# Patient Record
Sex: FEMALE | Race: WHITE | Employment: UNEMPLOYED | URBAN - NONMETROPOLITAN AREA
[De-identification: names, ages, dates, MRNs, and addresses within clinical notes are randomized per-mention and may not be internally consistent; named-entity substitution may affect disease eponyms.]

---

## 2018-01-12 LAB
CHOLESTEROL, TOTAL: 183 MG/DL
CHOLESTEROL/HDL RATIO: 3.4
HDLC SERPL-MCNC: 54 MG/DL (ref 35–70)
LDL CHOLESTEROL CALCULATED: 95 MG/DL (ref 0–160)
TRIGL SERPL-MCNC: 172 MG/DL
VLDLC SERPL CALC-MCNC: 34 MG/DL

## 2018-01-23 ENCOUNTER — OFFICE VISIT (OUTPATIENT)
Dept: CARDIOLOGY CLINIC | Age: 63
End: 2018-01-23
Payer: MEDICARE

## 2018-01-23 VITALS
HEIGHT: 64 IN | DIASTOLIC BLOOD PRESSURE: 90 MMHG | HEART RATE: 96 BPM | WEIGHT: 280 LBS | BODY MASS INDEX: 47.8 KG/M2 | SYSTOLIC BLOOD PRESSURE: 128 MMHG

## 2018-01-23 DIAGNOSIS — R06.00 DYSPNEA, UNSPECIFIED TYPE: Primary | ICD-10-CM

## 2018-01-23 PROCEDURE — 99204 OFFICE O/P NEW MOD 45 MIN: CPT | Performed by: INTERNAL MEDICINE

## 2018-01-23 RX ORDER — FLUOXETINE HYDROCHLORIDE 40 MG/1
40 CAPSULE ORAL DAILY
COMMUNITY
Start: 2017-10-16

## 2018-01-23 RX ORDER — LEVOTHYROXINE SODIUM 0.1 MG/1
TABLET ORAL
COMMUNITY
Start: 2018-01-15 | End: 2022-04-06

## 2018-01-23 RX ORDER — BENZTROPINE MESYLATE 0.5 MG/1
0.5 TABLET ORAL DAILY
COMMUNITY
Start: 2017-10-20

## 2018-01-23 RX ORDER — PRAVASTATIN SODIUM 10 MG
10 TABLET ORAL DAILY
COMMUNITY
Start: 2018-01-15

## 2018-01-23 RX ORDER — TOPIRAMATE 25 MG/1
25 TABLET ORAL 4 TIMES DAILY
COMMUNITY
End: 2022-04-06

## 2018-01-23 RX ORDER — LOSARTAN POTASSIUM AND HYDROCHLOROTHIAZIDE 12.5; 5 MG/1; MG/1
1 TABLET ORAL DAILY
Qty: 30 TABLET | Refills: 3 | Status: SHIPPED | OUTPATIENT
Start: 2018-01-23 | End: 2018-10-03 | Stop reason: SDUPTHER

## 2018-01-23 NOTE — PROGRESS NOTES
Today's Date: 1/23/2018  Patient Name: Livan Jordan  Patient's age: 63 y.o., 1955          The patient is a 63 y.o.  female is in the office for evaluation of WALKER and abnormal echo showing segmental wall motion abnormalities with preserved LVEF and mild diastolic dysfunction. Had a CXR on 1/12/2018 which showed mild pulmonary edema. Had severe fatigue and WALKER that has progressive over the last few weeks. Denies angina/syncope. Past Medical History:   has no past medical history on file. Past Surgical History:   has no past surgical history on file. Home Medications:    Prior to Admission medications    Not on File       Allergies:  Review of patient's allergies indicates not on file. Social History:        REVIEW OF SYSTEMS:  CONSTITUTIONAL:NEGATIVE  HEENT:NEG  Cardiovascular: No chest pain, Yes dyspnea on exertion, No palpitations. Lower extremity edema: No  RESPIRATORY: WALKER  GASTROINTESTINAL:  negative  GENITOURINARY:  negative  INTEGUMENT:  negative  MUSCULOSKELETAL:  positive for  pain  NEUROLOGICAL:  negative    PHYSICAL EXAM:      BP (!) 128/90   Pulse 96   Ht 5' 4\" (1.626 m)   Wt 280 lb (127 kg)   BMI 48.06 kg/m²    HEENT: PERRL, no cervical lymphadenopathy. No masses palpable. Cardiovascular:  · The apical impulse is not displaced  · Heart  Sounds:RRR, S4  · Jugular venous pulsation Normal  · The carotid upstroke is NL  · Peripheral pulses are symmetrical and full  Respiratory: Good respiratory effort. On auscultation: clear to auscultation bilaterally  Abdomen:  · No masses or tenderness  · Bowel sounds present  Extremities:  ·  No Cyanosis or Clubbing  ·  Lower extremity edema: No  Skin: Warm and dry    Cardiac data:    ECHO 1/18/2018: WMA, LVEF 48-50%, grade I diastolic dysfunction, no significant valvular abnormalities. Labs:      CBC: No results for input(s): WBC, HGB, HCT, PLT in the last 72 hours.   BMP: No results for input(s): NA, K, CO2, BUN,

## 2018-01-23 NOTE — PATIENT INSTRUCTIONS
Cardiolite Stress Test Instructions    Report to [facility], [address] entrance. Take a number for Registration at the [facility] GEROPSYCH UNIT. Date:                                                      Arrival Time:                          Please allow four hours to complete this test.     --PLEASE GIVE 24 HOURS NOTICE TO CANCEL/RESCHEDULE--     >>  Nothing to eat or drink for FOUR (4) HOURS prior to arrival time. >>  No caffeine for 24 hours prior to test. This includes decaffeinated beverages,  chocolate, tea and cola drinks. >>  If you are diabetic, check with your Primary Care Provider regarding changes in your insulin or diabetic pills on test day.    >>  No smoking for 12 hours before the test.     >>  Please bring your prescription medications with you on Stress Day. ~  Take regular medications with sips of water.       ~  Hold the following medications prior to day of stress test:     _______________________________________________       ~If you wear a Nitroglycerin patch, please hold for 48 hours. >>  If you are walking on the treadmill, wear comfortable clothes and shoes. Please do not apply lotion or oils to skin on the day of stress test.     +++ Pregnancy +++   If you are a woman of child bearing age, you will need to complete a blood test prior to your stress test. Please notify the nurse if you think you might be pregnant.

## 2018-02-16 ENCOUNTER — TELEPHONE (OUTPATIENT)
Dept: CARDIOLOGY CLINIC | Age: 63
End: 2018-02-16

## 2018-02-16 DIAGNOSIS — R06.00 DYSPNEA, UNSPECIFIED TYPE: ICD-10-CM

## 2018-02-16 NOTE — TELEPHONE ENCOUNTER
Pt aware of stress results. Follow up scheduled for 3/6/18 at [facility]..

## 2018-03-06 ENCOUNTER — OFFICE VISIT (OUTPATIENT)
Dept: CARDIOLOGY CLINIC | Age: 63
End: 2018-03-06
Payer: MEDICARE

## 2018-03-06 VITALS
BODY MASS INDEX: 48.06 KG/M2 | HEART RATE: 88 BPM | SYSTOLIC BLOOD PRESSURE: 124 MMHG | DIASTOLIC BLOOD PRESSURE: 80 MMHG | WEIGHT: 279.98 LBS

## 2018-03-06 DIAGNOSIS — R06.00 DYSPNEA, UNSPECIFIED TYPE: Primary | ICD-10-CM

## 2018-03-06 PROCEDURE — 99213 OFFICE O/P EST LOW 20 MIN: CPT | Performed by: INTERNAL MEDICINE

## 2018-10-01 RX ORDER — LOSARTAN POTASSIUM AND HYDROCHLOROTHIAZIDE 12.5; 5 MG/1; MG/1
TABLET ORAL
Qty: 30 TABLET | Refills: 3 | OUTPATIENT
Start: 2018-10-01

## 2018-10-03 RX ORDER — LOSARTAN POTASSIUM AND HYDROCHLOROTHIAZIDE 12.5; 5 MG/1; MG/1
1 TABLET ORAL DAILY
Qty: 30 TABLET | Refills: 3 | Status: SHIPPED | OUTPATIENT
Start: 2018-10-03 | End: 2018-10-18 | Stop reason: ALTCHOICE

## 2018-10-06 ENCOUNTER — HOSPITAL ENCOUNTER (INPATIENT)
Age: 63
LOS: 4 days | Discharge: HOME | DRG: 682 | End: 2018-10-10
Attending: EMERGENCY MEDICINE | Admitting: FAMILY MEDICINE
Payer: MEDICARE

## 2018-10-06 ENCOUNTER — APPOINTMENT (OUTPATIENT)
Dept: CT IMAGING | Age: 63
DRG: 682 | End: 2018-10-06
Payer: MEDICARE

## 2018-10-06 DIAGNOSIS — N39.0 URINARY TRACT INFECTION WITH HEMATURIA, SITE UNSPECIFIED: Primary | ICD-10-CM

## 2018-10-06 DIAGNOSIS — H53.8 BLURRED VISION: ICD-10-CM

## 2018-10-06 DIAGNOSIS — I95.9 HYPOTENSION, UNSPECIFIED HYPOTENSION TYPE: ICD-10-CM

## 2018-10-06 DIAGNOSIS — R31.9 URINARY TRACT INFECTION WITH HEMATURIA, SITE UNSPECIFIED: Primary | ICD-10-CM

## 2018-10-06 DIAGNOSIS — E87.6 HYPOKALEMIA: ICD-10-CM

## 2018-10-06 DIAGNOSIS — E78.5 HYPERLIPIDEMIA, UNSPECIFIED HYPERLIPIDEMIA TYPE: ICD-10-CM

## 2018-10-06 PROBLEM — E03.9 HYPOTHYROIDISM: Status: ACTIVE | Noted: 2018-10-06

## 2018-10-06 PROBLEM — N17.9 AKI (ACUTE KIDNEY INJURY) (HCC): Status: ACTIVE | Noted: 2018-10-06

## 2018-10-06 PROBLEM — J44.9 COPD (CHRONIC OBSTRUCTIVE PULMONARY DISEASE) (HCC): Status: ACTIVE | Noted: 2018-10-06

## 2018-10-06 PROBLEM — I10 HYPERTENSION: Status: ACTIVE | Noted: 2018-10-06

## 2018-10-06 PROBLEM — E87.1 HYPONATREMIA: Status: ACTIVE | Noted: 2018-10-06

## 2018-10-06 PROBLEM — R79.89 ELEVATED LACTIC ACID LEVEL: Status: ACTIVE | Noted: 2018-10-06

## 2018-10-06 PROBLEM — E11.9 DMII (DIABETES MELLITUS, TYPE 2) (HCC): Status: ACTIVE | Noted: 2018-10-06

## 2018-10-06 LAB
-: ABNORMAL
ABSOLUTE BANDS #: 2.02 K/UL
ABSOLUTE EOS #: 0 K/UL (ref 0–0.4)
ABSOLUTE IMMATURE GRANULOCYTE: ABNORMAL K/UL (ref 0–0.3)
ABSOLUTE LYMPH #: 0.5 K/UL (ref 1–4.8)
ABSOLUTE MONO #: 0.84 K/UL (ref 0.1–1.2)
ALBUMIN SERPL-MCNC: 3.6 G/DL (ref 3.5–5.2)
ALBUMIN/GLOBULIN RATIO: 1.1 (ref 1–2.5)
ALP BLD-CCNC: 81 U/L (ref 35–104)
ALT SERPL-CCNC: 16 U/L (ref 5–33)
AMORPHOUS: ABNORMAL
AMYLASE: 19 U/L (ref 28–100)
ANION GAP SERPL CALCULATED.3IONS-SCNC: 13 MMOL/L (ref 9–17)
AST SERPL-CCNC: 20 U/L
BACTERIA: ABNORMAL
BANDS: 12 %
BASOPHILS # BLD: 0 % (ref 0–1)
BASOPHILS ABSOLUTE: 0 K/UL (ref 0–0.2)
BILIRUB SERPL-MCNC: 1.02 MG/DL (ref 0.3–1.2)
BILIRUBIN DIRECT: 0.35 MG/DL
BILIRUBIN URINE: NEGATIVE
BILIRUBIN, INDIRECT: 0.67 MG/DL (ref 0–1)
BUN BLDV-MCNC: 13 MG/DL (ref 8–23)
BUN/CREAT BLD: 8 (ref 9–20)
CALCIUM SERPL-MCNC: 11.4 MG/DL (ref 8.6–10.4)
CASTS UA: ABNORMAL /LPF (ref 0–2)
CHLORIDE BLD-SCNC: 95 MMOL/L (ref 98–107)
CO2: 20 MMOL/L (ref 20–31)
COLOR: ABNORMAL
COMMENT UA: ABNORMAL
CREAT SERPL-MCNC: 1.72 MG/DL (ref 0.5–0.9)
CRYSTALS, UA: ABNORMAL /HPF
DIFFERENTIAL TYPE: ABNORMAL
EKG ATRIAL RATE: 104 BPM
EKG Q-T INTERVAL: 496 MS
EKG QRS DURATION: 104 MS
EKG QTC CALCULATION (BAZETT): 646 MS
EKG R AXIS: 53 DEGREES
EKG T AXIS: 145 DEGREES
EKG VENTRICULAR RATE: 102 BPM
EOSINOPHILS RELATIVE PERCENT: 0 % (ref 1–7)
EPITHELIAL CELLS UA: ABNORMAL /HPF (ref 0–5)
GFR AFRICAN AMERICAN: 37 ML/MIN
GFR NON-AFRICAN AMERICAN: 30 ML/MIN
GFR SERPL CREATININE-BSD FRML MDRD: ABNORMAL ML/MIN/{1.73_M2}
GFR SERPL CREATININE-BSD FRML MDRD: ABNORMAL ML/MIN/{1.73_M2}
GLOBULIN: 3.4 G/DL (ref 1.5–3.8)
GLUCOSE BLD-MCNC: 196 MG/DL (ref 65–105)
GLUCOSE BLD-MCNC: 229 MG/DL (ref 70–99)
GLUCOSE URINE: NEGATIVE
HCT VFR BLD CALC: 39.6 % (ref 36–46)
HEMOGLOBIN: 13.4 G/DL (ref 12–16)
IMMATURE GRANULOCYTES: ABNORMAL %
INR BLD: 1.2
KETONES, URINE: NEGATIVE
LACTIC ACID: 3 MMOL/L (ref 0.5–2.2)
LACTIC ACID: 3.6 MMOL/L (ref 0.5–2.2)
LEUKOCYTE ESTERASE, URINE: ABNORMAL
LIPASE: 9 U/L (ref 13–60)
LYMPHOCYTES # BLD: 3 % (ref 16–46)
MAGNESIUM: 1.3 MG/DL (ref 1.6–2.6)
MCH RBC QN AUTO: 31.6 PG (ref 26–34)
MCHC RBC AUTO-ENTMCNC: 33.7 G/DL (ref 31–37)
MCV RBC AUTO: 93.8 FL (ref 80–100)
MONOCYTES # BLD: 5 % (ref 4–11)
MORPHOLOGY: ABNORMAL
MUCUS: ABNORMAL
NITRITE, URINE: NEGATIVE
NRBC AUTOMATED: ABNORMAL PER 100 WBC
OTHER OBSERVATIONS UA: ABNORMAL
PARTIAL THROMBOPLASTIN TIME: 30.1 SEC (ref 27–35)
PDW BLD-RTO: 13 % (ref 11–14.5)
PH UA: 5.5 (ref 5–6)
PLATELET # BLD: 187 K/UL (ref 140–450)
PLATELET ESTIMATE: ABNORMAL
PMV BLD AUTO: 9.6 FL (ref 6–12)
POTASSIUM SERPL-SCNC: 3 MMOL/L (ref 3.7–5.3)
PROTEIN UA: NEGATIVE
PROTHROMBIN TIME: 12.3 SEC (ref 9.4–11.3)
RBC # BLD: 4.23 M/UL (ref 4–5.2)
RBC # BLD: ABNORMAL 10*6/UL
RBC UA: ABNORMAL /HPF (ref 0–4)
RENAL EPITHELIAL, UA: ABNORMAL /HPF
SEG NEUTROPHILS: 80 % (ref 43–77)
SEGMENTED NEUTROPHILS ABSOLUTE COUNT: 13.44 K/UL (ref 1.8–7.7)
SODIUM BLD-SCNC: 128 MMOL/L (ref 135–144)
SPECIFIC GRAVITY UA: 1 (ref 1.01–1.02)
TOTAL PROTEIN: 7 G/DL (ref 6.4–8.3)
TRICHOMONAS: ABNORMAL
TROPONIN INTERP: NORMAL
TROPONIN T: <0.03 NG/ML
TURBIDITY: ABNORMAL
URINE HGB: ABNORMAL
UROBILINOGEN, URINE: NORMAL
WBC # BLD: 16.8 K/UL (ref 3.5–11)
WBC # BLD: ABNORMAL 10*3/UL
WBC UA: >50 /HPF (ref 0–4)
YEAST: ABNORMAL

## 2018-10-06 PROCEDURE — 85730 THROMBOPLASTIN TIME PARTIAL: CPT

## 2018-10-06 PROCEDURE — 80076 HEPATIC FUNCTION PANEL: CPT

## 2018-10-06 PROCEDURE — 81001 URINALYSIS AUTO W/SCOPE: CPT

## 2018-10-06 PROCEDURE — 93005 ELECTROCARDIOGRAM TRACING: CPT

## 2018-10-06 PROCEDURE — 94760 N-INVAS EAR/PLS OXIMETRY 1: CPT

## 2018-10-06 PROCEDURE — 83735 ASSAY OF MAGNESIUM: CPT

## 2018-10-06 PROCEDURE — 6370000000 HC RX 637 (ALT 250 FOR IP): Performed by: EMERGENCY MEDICINE

## 2018-10-06 PROCEDURE — 82947 ASSAY GLUCOSE BLOOD QUANT: CPT

## 2018-10-06 PROCEDURE — 84484 ASSAY OF TROPONIN QUANT: CPT

## 2018-10-06 PROCEDURE — 80048 BASIC METABOLIC PNL TOTAL CA: CPT

## 2018-10-06 PROCEDURE — 87086 URINE CULTURE/COLONY COUNT: CPT

## 2018-10-06 PROCEDURE — 2060000000 HC ICU INTERMEDIATE R&B

## 2018-10-06 PROCEDURE — 6370000000 HC RX 637 (ALT 250 FOR IP): Performed by: NURSE PRACTITIONER

## 2018-10-06 PROCEDURE — 83690 ASSAY OF LIPASE: CPT

## 2018-10-06 PROCEDURE — 2580000003 HC RX 258: Performed by: NURSE PRACTITIONER

## 2018-10-06 PROCEDURE — 36415 COLL VENOUS BLD VENIPUNCTURE: CPT

## 2018-10-06 PROCEDURE — 2580000003 HC RX 258: Performed by: EMERGENCY MEDICINE

## 2018-10-06 PROCEDURE — 83605 ASSAY OF LACTIC ACID: CPT

## 2018-10-06 PROCEDURE — 87077 CULTURE AEROBIC IDENTIFY: CPT

## 2018-10-06 PROCEDURE — 87186 SC STD MICRODIL/AGAR DIL: CPT

## 2018-10-06 PROCEDURE — 6360000002 HC RX W HCPCS: Performed by: EMERGENCY MEDICINE

## 2018-10-06 PROCEDURE — 99285 EMERGENCY DEPT VISIT HI MDM: CPT

## 2018-10-06 PROCEDURE — 85610 PROTHROMBIN TIME: CPT

## 2018-10-06 PROCEDURE — 70450 CT HEAD/BRAIN W/O DYE: CPT

## 2018-10-06 PROCEDURE — 6360000002 HC RX W HCPCS: Performed by: NURSE PRACTITIONER

## 2018-10-06 PROCEDURE — 82150 ASSAY OF AMYLASE: CPT

## 2018-10-06 PROCEDURE — 87040 BLOOD CULTURE FOR BACTERIA: CPT

## 2018-10-06 PROCEDURE — 85025 COMPLETE CBC W/AUTO DIFF WBC: CPT

## 2018-10-06 RX ORDER — 0.9 % SODIUM CHLORIDE 0.9 %
1000 INTRAVENOUS SOLUTION INTRAVENOUS ONCE
Status: COMPLETED | OUTPATIENT
Start: 2018-10-06 | End: 2018-10-06

## 2018-10-06 RX ORDER — LEVOTHYROXINE SODIUM 0.1 MG/1
100 TABLET ORAL DAILY
Status: DISCONTINUED | OUTPATIENT
Start: 2018-10-07 | End: 2018-10-10 | Stop reason: HOSPADM

## 2018-10-06 RX ORDER — 0.9 % SODIUM CHLORIDE 0.9 %
500 INTRAVENOUS SOLUTION INTRAVENOUS ONCE
Status: COMPLETED | OUTPATIENT
Start: 2018-10-06 | End: 2018-10-06

## 2018-10-06 RX ORDER — 0.9 % SODIUM CHLORIDE 0.9 %
1000 INTRAVENOUS SOLUTION INTRAVENOUS ONCE
Status: COMPLETED | OUTPATIENT
Start: 2018-10-07 | End: 2018-10-07

## 2018-10-06 RX ORDER — BENZTROPINE MESYLATE 1 MG/1
0.5 TABLET ORAL NIGHTLY
Status: DISCONTINUED | OUTPATIENT
Start: 2018-10-06 | End: 2018-10-10 | Stop reason: HOSPADM

## 2018-10-06 RX ORDER — 0.9 % SODIUM CHLORIDE 0.9 %
1800 INTRAVENOUS SOLUTION INTRAVENOUS ONCE
Status: COMPLETED | OUTPATIENT
Start: 2018-10-06 | End: 2018-10-07

## 2018-10-06 RX ORDER — FLUOXETINE HYDROCHLORIDE 20 MG/1
40 CAPSULE ORAL DAILY
Status: DISCONTINUED | OUTPATIENT
Start: 2018-10-07 | End: 2018-10-10 | Stop reason: HOSPADM

## 2018-10-06 RX ORDER — POTASSIUM BICARBONATE 25 MEQ/1
25 TABLET, EFFERVESCENT ORAL 2 TIMES DAILY
Status: DISCONTINUED | OUTPATIENT
Start: 2018-10-06 | End: 2018-10-06

## 2018-10-06 RX ORDER — MAGNESIUM SULFATE 1 G/100ML
1 INJECTION INTRAVENOUS
Status: COMPLETED | OUTPATIENT
Start: 2018-10-07 | End: 2018-10-07

## 2018-10-06 RX ORDER — DEXTROSE MONOHYDRATE 50 MG/ML
100 INJECTION, SOLUTION INTRAVENOUS PRN
Status: DISCONTINUED | OUTPATIENT
Start: 2018-10-06 | End: 2018-10-10 | Stop reason: HOSPADM

## 2018-10-06 RX ORDER — SODIUM CHLORIDE 0.9 % (FLUSH) 0.9 %
10 SYRINGE (ML) INJECTION PRN
Status: DISCONTINUED | OUTPATIENT
Start: 2018-10-06 | End: 2018-10-10 | Stop reason: HOSPADM

## 2018-10-06 RX ORDER — NICOTINE POLACRILEX 4 MG
15 LOZENGE BUCCAL PRN
Status: DISCONTINUED | OUTPATIENT
Start: 2018-10-06 | End: 2018-10-10 | Stop reason: HOSPADM

## 2018-10-06 RX ORDER — ASPIRIN 81 MG/1
81 TABLET, CHEWABLE ORAL DAILY
Status: DISCONTINUED | OUTPATIENT
Start: 2018-10-07 | End: 2018-10-10 | Stop reason: HOSPADM

## 2018-10-06 RX ORDER — SODIUM CHLORIDE 0.9 % (FLUSH) 0.9 %
10 SYRINGE (ML) INJECTION EVERY 12 HOURS SCHEDULED
Status: DISCONTINUED | OUTPATIENT
Start: 2018-10-06 | End: 2018-10-10 | Stop reason: HOSPADM

## 2018-10-06 RX ORDER — SODIUM CHLORIDE AND POTASSIUM CHLORIDE .9; .15 G/100ML; G/100ML
SOLUTION INTRAVENOUS CONTINUOUS
Status: DISCONTINUED | OUTPATIENT
Start: 2018-10-06 | End: 2018-10-09

## 2018-10-06 RX ORDER — CIPROFLOXACIN 2 MG/ML
400 INJECTION, SOLUTION INTRAVENOUS ONCE
Status: COMPLETED | OUTPATIENT
Start: 2018-10-06 | End: 2018-10-06

## 2018-10-06 RX ORDER — DEXTROSE MONOHYDRATE 25 G/50ML
12.5 INJECTION, SOLUTION INTRAVENOUS PRN
Status: DISCONTINUED | OUTPATIENT
Start: 2018-10-06 | End: 2018-10-10 | Stop reason: HOSPADM

## 2018-10-06 RX ORDER — ONDANSETRON 2 MG/ML
4 INJECTION INTRAMUSCULAR; INTRAVENOUS EVERY 6 HOURS PRN
Status: DISCONTINUED | OUTPATIENT
Start: 2018-10-06 | End: 2018-10-10 | Stop reason: HOSPADM

## 2018-10-06 RX ADMIN — INSULIN LISPRO 1 UNITS: 100 INJECTION, SOLUTION INTRAVENOUS; SUBCUTANEOUS at 22:56

## 2018-10-06 RX ADMIN — MAGNESIUM SULFATE HEPTAHYDRATE 1 G: 1 INJECTION, SOLUTION INTRAVENOUS at 23:51

## 2018-10-06 RX ADMIN — POTASSIUM BICARBONATE 25 MEQ: 25 TABLET, EFFERVESCENT ORAL at 20:57

## 2018-10-06 RX ADMIN — BENZTROPINE MESYLATE 0.5 MG: 1 TABLET ORAL at 23:06

## 2018-10-06 RX ADMIN — CEFTRIAXONE 1 G: 1 INJECTION, POWDER, FOR SOLUTION INTRAMUSCULAR; INTRAVENOUS at 22:56

## 2018-10-06 RX ADMIN — SODIUM CHLORIDE 500 ML: 0.9 INJECTION, SOLUTION INTRAVENOUS at 18:59

## 2018-10-06 RX ADMIN — SODIUM CHLORIDE 1000 ML: 9 INJECTION, SOLUTION INTRAVENOUS at 20:14

## 2018-10-06 RX ADMIN — SODIUM CHLORIDE 1800 ML: 9 INJECTION, SOLUTION INTRAVENOUS at 22:46

## 2018-10-06 RX ADMIN — CIPROFLOXACIN 400 MG: 2 INJECTION, SOLUTION INTRAVENOUS at 21:18

## 2018-10-06 RX ADMIN — SODIUM CHLORIDE 500 ML: 0.9 INJECTION, SOLUTION INTRAVENOUS at 19:46

## 2018-10-06 ASSESSMENT — PAIN SCALES - GENERAL: PAINLEVEL_OUTOF10: 0

## 2018-10-06 NOTE — ED PROVIDER NOTES
[facility] ED  [address]  Phone: [phone]  eMERGENCY dEPARTMENT eNCOUnter      Pt Name: Janis Candelario  MRN: [id number]  [place] 1955  Date of evaluation: 10/6/2018    CHIEF COMPLAINT       Chief Complaint   Patient presents with    Shaking    Blurred Vision    Diabetes        HISTORY OF PRESENT ILLNESS    Janis Candelario is a 63 y.o. female who presents To the emergency department with worsening shakiness over the past couple of days as well as blurred vision. Yesterday had some abdominal pain that is currently resolved. Denies chest pain or shortness of breath no fevers or chills. She hasn't been eating very much according to the patient's family member. They've noticed that her shakiness is gotten worse over the past month. She's been incontinent of urine as well. She's been drinking more and urinating more than normal.  Blood sugars have been fluctuating. She presents hypotensive and tachycardic. REVIEW OF SYSTEMS       Constitutional: No fevers or chills   HEENT: No sore throat, rhinorrhea, or earache   Eyes: Positive blurry vision or double vision or drainage  Cardiovascular: No chest pain or tachycardia   Respiratory: No wheezing or shortness of breath no cough   Gastrointestinal: Positive decreased appetite no nausea or vomiting no diarrhea or constipation oh, pain  : No hematuria or dysuria   Musculoskeletal: No swelling or pain   Skin: No rash   Neurological: No focal neurologic complaints, paresthesias, weakness, or headache     PAST MEDICAL HISTORY    has a past medical history of Anxiety; Depression; Hyperlipidemia; Schizophrenia ([address] Type 2 diabetes mellitus without complication ([address] and Uterine cancer ([facility] [address] SURGICAL HISTORY      has a past surgical history that includes Hysterectomy, total abdominal and Colonoscopy.     CURRENT MEDICATIONS       Previous Medications    ASPIRIN 81 MG TABLET    Take 81 mg by mouth daily    BENZTROPINE

## 2018-10-07 ENCOUNTER — APPOINTMENT (OUTPATIENT)
Dept: GENERAL RADIOLOGY | Age: 63
DRG: 682 | End: 2018-10-07
Payer: MEDICARE

## 2018-10-07 PROBLEM — E87.1 HYPONATREMIA: Status: RESOLVED | Noted: 2018-10-06 | Resolved: 2018-10-07

## 2018-10-07 PROBLEM — R79.89 ELEVATED LACTIC ACID LEVEL: Status: RESOLVED | Noted: 2018-10-06 | Resolved: 2018-10-07

## 2018-10-07 LAB
ABSOLUTE EOS #: 0 K/UL (ref 0–0.4)
ABSOLUTE IMMATURE GRANULOCYTE: ABNORMAL K/UL (ref 0–0.3)
ABSOLUTE LYMPH #: 1.2 K/UL (ref 1–4.8)
ABSOLUTE MONO #: 1.3 K/UL (ref 0.1–1.2)
ANION GAP SERPL CALCULATED.3IONS-SCNC: 11 MMOL/L (ref 9–17)
BASOPHILS # BLD: 1 % (ref 0–1)
BASOPHILS ABSOLUTE: 0.1 K/UL (ref 0–0.2)
BUN BLDV-MCNC: 15 MG/DL (ref 8–23)
BUN/CREAT BLD: 12 (ref 9–20)
CALCIUM SERPL-MCNC: 9.9 MG/DL (ref 8.6–10.4)
CHLORIDE BLD-SCNC: 103 MMOL/L (ref 98–107)
CO2: 20 MMOL/L (ref 20–31)
CREAT SERPL-MCNC: 1.26 MG/DL (ref 0.5–0.9)
DIFFERENTIAL TYPE: ABNORMAL
EOSINOPHILS RELATIVE PERCENT: 0 % (ref 1–7)
ESTIMATED AVERAGE GLUCOSE: 306 MG/DL
GFR AFRICAN AMERICAN: 53 ML/MIN
GFR NON-AFRICAN AMERICAN: 43 ML/MIN
GFR SERPL CREATININE-BSD FRML MDRD: ABNORMAL ML/MIN/{1.73_M2}
GFR SERPL CREATININE-BSD FRML MDRD: ABNORMAL ML/MIN/{1.73_M2}
GLUCOSE BLD-MCNC: 137 MG/DL (ref 70–99)
GLUCOSE BLD-MCNC: 146 MG/DL (ref 65–105)
GLUCOSE BLD-MCNC: 151 MG/DL (ref 65–105)
GLUCOSE BLD-MCNC: 182 MG/DL (ref 65–105)
HBA1C MFR BLD: 12.3 % (ref 4.8–5.9)
HCT VFR BLD CALC: 35.3 % (ref 36–46)
HEMOGLOBIN: 11.6 G/DL (ref 12–16)
IMMATURE GRANULOCYTES: ABNORMAL %
LACTIC ACID: 1.1 MMOL/L (ref 0.5–2.2)
LYMPHOCYTES # BLD: 10 % (ref 16–46)
MAGNESIUM: 1.9 MG/DL (ref 1.6–2.6)
MCH RBC QN AUTO: 31.1 PG (ref 26–34)
MCHC RBC AUTO-ENTMCNC: 32.9 G/DL (ref 31–37)
MCV RBC AUTO: 94.4 FL (ref 80–100)
MONOCYTES # BLD: 10 % (ref 4–11)
NRBC AUTOMATED: ABNORMAL PER 100 WBC
PDW BLD-RTO: 13 % (ref 11–14.5)
PLATELET # BLD: 138 K/UL (ref 140–450)
PLATELET ESTIMATE: ABNORMAL
PMV BLD AUTO: 10.1 FL (ref 6–12)
POTASSIUM SERPL-SCNC: 3.1 MMOL/L (ref 3.7–5.3)
POTASSIUM SERPL-SCNC: 3.6 MMOL/L (ref 3.7–5.3)
RBC # BLD: 3.74 M/UL (ref 4–5.2)
RBC # BLD: ABNORMAL 10*6/UL
SEG NEUTROPHILS: 79 % (ref 43–77)
SEGMENTED NEUTROPHILS ABSOLUTE COUNT: 10.1 K/UL (ref 1.8–7.7)
SODIUM BLD-SCNC: 134 MMOL/L (ref 135–144)
TSH SERPL DL<=0.05 MIU/L-ACNC: 1.2 MIU/L (ref 0.3–5)
WBC # BLD: 12.7 K/UL (ref 3.5–11)
WBC # BLD: ABNORMAL 10*3/UL

## 2018-10-07 PROCEDURE — 6360000002 HC RX W HCPCS: Performed by: NURSE PRACTITIONER

## 2018-10-07 PROCEDURE — 84443 ASSAY THYROID STIM HORMONE: CPT

## 2018-10-07 PROCEDURE — 6370000000 HC RX 637 (ALT 250 FOR IP): Performed by: NURSE PRACTITIONER

## 2018-10-07 PROCEDURE — 83605 ASSAY OF LACTIC ACID: CPT

## 2018-10-07 PROCEDURE — 2060000000 HC ICU INTERMEDIATE R&B

## 2018-10-07 PROCEDURE — 83036 HEMOGLOBIN GLYCOSYLATED A1C: CPT

## 2018-10-07 PROCEDURE — 85025 COMPLETE CBC W/AUTO DIFF WBC: CPT

## 2018-10-07 PROCEDURE — 36415 COLL VENOUS BLD VENIPUNCTURE: CPT

## 2018-10-07 PROCEDURE — 99232 SBSQ HOSP IP/OBS MODERATE 35: CPT | Performed by: FAMILY MEDICINE

## 2018-10-07 PROCEDURE — 6360000002 HC RX W HCPCS: Performed by: FAMILY MEDICINE

## 2018-10-07 PROCEDURE — 2580000003 HC RX 258: Performed by: NURSE PRACTITIONER

## 2018-10-07 PROCEDURE — 94761 N-INVAS EAR/PLS OXIMETRY MLT: CPT

## 2018-10-07 PROCEDURE — 84132 ASSAY OF SERUM POTASSIUM: CPT

## 2018-10-07 PROCEDURE — 80048 BASIC METABOLIC PNL TOTAL CA: CPT

## 2018-10-07 PROCEDURE — 83735 ASSAY OF MAGNESIUM: CPT

## 2018-10-07 PROCEDURE — 6370000000 HC RX 637 (ALT 250 FOR IP): Performed by: FAMILY MEDICINE

## 2018-10-07 PROCEDURE — 71045 X-RAY EXAM CHEST 1 VIEW: CPT

## 2018-10-07 PROCEDURE — 82947 ASSAY GLUCOSE BLOOD QUANT: CPT

## 2018-10-07 RX ORDER — INSULIN GLARGINE 100 [IU]/ML
10 INJECTION, SOLUTION SUBCUTANEOUS NIGHTLY
Status: DISCONTINUED | OUTPATIENT
Start: 2018-10-07 | End: 2018-10-10 | Stop reason: HOSPADM

## 2018-10-07 RX ORDER — POTASSIUM CHLORIDE 20 MEQ/1
20 TABLET, EXTENDED RELEASE ORAL
Status: DISCONTINUED | OUTPATIENT
Start: 2018-10-07 | End: 2018-10-07

## 2018-10-07 RX ORDER — POTASSIUM CHLORIDE 20 MEQ/1
40 TABLET, EXTENDED RELEASE ORAL
Status: DISCONTINUED | OUTPATIENT
Start: 2018-10-08 | End: 2018-10-10 | Stop reason: HOSPADM

## 2018-10-07 RX ORDER — POTASSIUM CHLORIDE 20 MEQ/1
40 TABLET, EXTENDED RELEASE ORAL ONCE
Status: COMPLETED | OUTPATIENT
Start: 2018-10-07 | End: 2018-10-07

## 2018-10-07 RX ORDER — ACETAMINOPHEN 325 MG/1
650 TABLET ORAL EVERY 4 HOURS PRN
Status: DISCONTINUED | OUTPATIENT
Start: 2018-10-07 | End: 2018-10-10 | Stop reason: HOSPADM

## 2018-10-07 RX ADMIN — POTASSIUM CHLORIDE 20 MEQ: 20 TABLET, EXTENDED RELEASE ORAL at 08:46

## 2018-10-07 RX ADMIN — ASPIRIN 81 MG 81 MG: 81 TABLET ORAL at 08:46

## 2018-10-07 RX ADMIN — POTASSIUM CHLORIDE AND SODIUM CHLORIDE: 900; 150 INJECTION, SOLUTION INTRAVENOUS at 10:08

## 2018-10-07 RX ADMIN — INSULIN LISPRO 1 UNITS: 100 INJECTION, SOLUTION INTRAVENOUS; SUBCUTANEOUS at 17:03

## 2018-10-07 RX ADMIN — FLUOXETINE 40 MG: 20 CAPSULE ORAL at 08:46

## 2018-10-07 RX ADMIN — ENOXAPARIN SODIUM 40 MG: 40 INJECTION SUBCUTANEOUS at 08:46

## 2018-10-07 RX ADMIN — INSULIN LISPRO 1 UNITS: 100 INJECTION, SOLUTION INTRAVENOUS; SUBCUTANEOUS at 12:51

## 2018-10-07 RX ADMIN — CEFTRIAXONE 1 G: 1 INJECTION, POWDER, FOR SOLUTION INTRAMUSCULAR; INTRAVENOUS at 22:49

## 2018-10-07 RX ADMIN — BENZTROPINE MESYLATE 0.5 MG: 1 TABLET ORAL at 21:26

## 2018-10-07 RX ADMIN — SODIUM CHLORIDE 1000 ML: 9 INJECTION, SOLUTION INTRAVENOUS at 00:52

## 2018-10-07 RX ADMIN — MAGNESIUM SULFATE HEPTAHYDRATE 1 G: 1 INJECTION, SOLUTION INTRAVENOUS at 00:51

## 2018-10-07 RX ADMIN — LEVOTHYROXINE SODIUM 100 MCG: 100 TABLET ORAL at 06:34

## 2018-10-07 RX ADMIN — ACETAMINOPHEN 650 MG: 325 TABLET ORAL at 22:33

## 2018-10-07 RX ADMIN — POTASSIUM CHLORIDE 40 MEQ: 20 TABLET, EXTENDED RELEASE ORAL at 06:34

## 2018-10-07 RX ADMIN — INSULIN GLARGINE 10 UNITS: 100 INJECTION, SOLUTION SUBCUTANEOUS at 21:26

## 2018-10-07 RX ADMIN — ACETAMINOPHEN 650 MG: 325 TABLET ORAL at 12:47

## 2018-10-07 RX ADMIN — POTASSIUM CHLORIDE AND SODIUM CHLORIDE: 900; 150 INJECTION, SOLUTION INTRAVENOUS at 19:44

## 2018-10-07 RX ADMIN — INSULIN LISPRO 1 UNITS: 100 INJECTION, SOLUTION INTRAVENOUS; SUBCUTANEOUS at 21:26

## 2018-10-07 RX ADMIN — POTASSIUM CHLORIDE 40 MEQ: 20 TABLET, EXTENDED RELEASE ORAL at 23:41

## 2018-10-07 RX ADMIN — POTASSIUM CHLORIDE AND SODIUM CHLORIDE: 900; 150 INJECTION, SOLUTION INTRAVENOUS at 01:57

## 2018-10-07 ASSESSMENT — PAIN SCALES - GENERAL
PAINLEVEL_OUTOF10: 0
PAINLEVEL_OUTOF10: 1
PAINLEVEL_OUTOF10: 0

## 2018-10-07 NOTE — H&P
post-void residual to check for urinary retention  2. Elevated lactic acid/SIRS--IV fluid boluses, Lactic acid Q4h, blood cultures pending  3. REBECCA--IV hydration, monitor renal function and I&Os--holding Hyzaar  4. Hypokalemia--replaced, recheck in AM  5. DMII with hyperglycemia--ISS, monitor and titrate prn, check HgbA1C in AM--holding glucophage during hospitalization  6. DVT prophylaxis--lovenox  7. Home medications reviewed  8.  See orders     Note that over 50 minutes was spent in evaluation of the patient, review of the chart and pertinent records, discussion with family/staff, etc    Micki PHAN, NP-C, FNP-BC  10:56 PM  10/6/2018

## 2018-10-07 NOTE — PLAN OF CARE
Problem: Falls - Risk of:  Goal: Absence of physical injury  Absence of physical injury   Outcome: Ongoing      Problem: Sensory:  Goal: General experience of comfort will improve  General experience of comfort will improve  Outcome: Ongoing      Problem: Urinary Elimination:  Goal: Signs and symptoms of infection will decrease  Signs and symptoms of infection will decrease  Outcome: Ongoing

## 2018-10-07 NOTE — PROGRESS NOTES
bruit, no JVD, supple, symmetrical, trachea midline and thyroid not enlarged, symmetric, no tenderness/mass/nodules  Lungs: clear to auscultation bilaterally  Heart: regular rate and rhythm, S1, S2 normal, no murmur, click, rub or gallop  Abdomen: soft, non-tender; bowel sounds normal; no masses,  no organomegaly,obese. Extremities: extremities normal, atraumatic, no cyanosis or edema  Neurologic: Mental status: Alert, oriented, thought content appropriate    Assessment and Plan:   1. UTI. 2. Generalized weakness. 3. DM type 2 poorly controlled/    Patient continues to require in patient admission FOR iv antibiotics,await cultures,Insulin SS coverage for DM also start on lantus tonight. CXR to evaluate cough,PT/OT consult    Patient Active Problem List:     UTI (urinary tract infection)     Hypokalemia     REBECCA (acute kidney injury) ([address]     DMII (diabetes mellitus, type 2) (HCC)     Hypertension     COPD (chronic obstructive pulmonary disease) ([address]     Hyperlipidemia     Hypothyroidism      Jarocho Villalobos MD  [facility] Hospitalist

## 2018-10-08 PROBLEM — E66.01 CLASS 3 SEVERE OBESITY WITH BODY MASS INDEX (BMI) OF 40.0 TO 44.9 IN ADULT (HCC): Status: ACTIVE | Noted: 2018-10-08

## 2018-10-08 LAB
ABSOLUTE EOS #: 0.3 K/UL (ref 0–0.4)
ABSOLUTE IMMATURE GRANULOCYTE: ABNORMAL K/UL (ref 0–0.3)
ABSOLUTE LYMPH #: 1.82 K/UL (ref 1–4.8)
ABSOLUTE MONO #: 1.62 K/UL (ref 0.1–1.2)
ANION GAP SERPL CALCULATED.3IONS-SCNC: 7 MMOL/L (ref 9–17)
BASOPHILS # BLD: 1 % (ref 0–1)
BASOPHILS ABSOLUTE: 0.1 K/UL (ref 0–0.2)
BUN BLDV-MCNC: 8 MG/DL (ref 8–23)
BUN/CREAT BLD: 9 (ref 9–20)
CALCIUM SERPL-MCNC: 10.3 MG/DL (ref 8.6–10.4)
CHLORIDE BLD-SCNC: 110 MMOL/L (ref 98–107)
CO2: 23 MMOL/L (ref 20–31)
CREAT SERPL-MCNC: 0.89 MG/DL (ref 0.5–0.9)
DIFFERENTIAL TYPE: ABNORMAL
EOSINOPHILS RELATIVE PERCENT: 3 % (ref 1–7)
GFR AFRICAN AMERICAN: >60 ML/MIN
GFR NON-AFRICAN AMERICAN: >60 ML/MIN
GFR SERPL CREATININE-BSD FRML MDRD: ABNORMAL ML/MIN/{1.73_M2}
GFR SERPL CREATININE-BSD FRML MDRD: ABNORMAL ML/MIN/{1.73_M2}
GLUCOSE BLD-MCNC: 141 MG/DL (ref 65–105)
GLUCOSE BLD-MCNC: 141 MG/DL (ref 70–99)
GLUCOSE BLD-MCNC: 148 MG/DL (ref 65–105)
GLUCOSE BLD-MCNC: 168 MG/DL (ref 65–105)
HCT VFR BLD CALC: 32.6 % (ref 36–46)
HEMOGLOBIN: 10.8 G/DL (ref 12–16)
IMMATURE GRANULOCYTES: ABNORMAL %
LYMPHOCYTES # BLD: 18 % (ref 16–46)
MAGNESIUM: 1.7 MG/DL (ref 1.6–2.6)
MCH RBC QN AUTO: 30.7 PG (ref 26–34)
MCHC RBC AUTO-ENTMCNC: 33.1 G/DL (ref 31–37)
MCV RBC AUTO: 92.5 FL (ref 80–100)
MONOCYTES # BLD: 16 % (ref 4–11)
MORPHOLOGY: ABNORMAL
NRBC AUTOMATED: ABNORMAL PER 100 WBC
PDW BLD-RTO: 11.7 % (ref 11–14.5)
PLATELET # BLD: 136 K/UL (ref 140–450)
PLATELET ESTIMATE: ABNORMAL
PMV BLD AUTO: 9.9 FL (ref 6–12)
POTASSIUM SERPL-SCNC: 4.6 MMOL/L (ref 3.7–5.3)
RBC # BLD: 3.52 M/UL (ref 4–5.2)
RBC # BLD: ABNORMAL 10*6/UL
SEG NEUTROPHILS: 62 % (ref 43–77)
SEGMENTED NEUTROPHILS ABSOLUTE COUNT: 6.26 K/UL (ref 1.8–7.7)
SODIUM BLD-SCNC: 140 MMOL/L (ref 135–144)
WBC # BLD: 10.1 K/UL (ref 3.5–11)
WBC # BLD: ABNORMAL 10*3/UL

## 2018-10-08 PROCEDURE — 83735 ASSAY OF MAGNESIUM: CPT

## 2018-10-08 PROCEDURE — G8978 MOBILITY CURRENT STATUS: HCPCS | Performed by: PHYSICAL THERAPIST

## 2018-10-08 PROCEDURE — 97116 GAIT TRAINING THERAPY: CPT | Performed by: PHYSICAL THERAPIST

## 2018-10-08 PROCEDURE — 94761 N-INVAS EAR/PLS OXIMETRY MLT: CPT

## 2018-10-08 PROCEDURE — 97161 PT EVAL LOW COMPLEX 20 MIN: CPT | Performed by: PHYSICAL THERAPIST

## 2018-10-08 PROCEDURE — 85025 COMPLETE CBC W/AUTO DIFF WBC: CPT

## 2018-10-08 PROCEDURE — G8988 SELF CARE GOAL STATUS: HCPCS | Performed by: OCCUPATIONAL THERAPIST

## 2018-10-08 PROCEDURE — 94640 AIRWAY INHALATION TREATMENT: CPT

## 2018-10-08 PROCEDURE — G8979 MOBILITY GOAL STATUS: HCPCS | Performed by: PHYSICAL THERAPIST

## 2018-10-08 PROCEDURE — 36415 COLL VENOUS BLD VENIPUNCTURE: CPT

## 2018-10-08 PROCEDURE — 99232 SBSQ HOSP IP/OBS MODERATE 35: CPT | Performed by: INTERNAL MEDICINE

## 2018-10-08 PROCEDURE — 2580000003 HC RX 258: Performed by: NURSE PRACTITIONER

## 2018-10-08 PROCEDURE — 82947 ASSAY GLUCOSE BLOOD QUANT: CPT

## 2018-10-08 PROCEDURE — 2060000000 HC ICU INTERMEDIATE R&B

## 2018-10-08 PROCEDURE — 6360000002 HC RX W HCPCS: Performed by: INTERNAL MEDICINE

## 2018-10-08 PROCEDURE — G8987 SELF CARE CURRENT STATUS: HCPCS | Performed by: OCCUPATIONAL THERAPIST

## 2018-10-08 PROCEDURE — 97165 OT EVAL LOW COMPLEX 30 MIN: CPT | Performed by: OCCUPATIONAL THERAPIST

## 2018-10-08 PROCEDURE — 6370000000 HC RX 637 (ALT 250 FOR IP): Performed by: NURSE PRACTITIONER

## 2018-10-08 PROCEDURE — 6360000002 HC RX W HCPCS: Performed by: FAMILY MEDICINE

## 2018-10-08 PROCEDURE — 80048 BASIC METABOLIC PNL TOTAL CA: CPT

## 2018-10-08 PROCEDURE — 6360000002 HC RX W HCPCS

## 2018-10-08 PROCEDURE — 6370000000 HC RX 637 (ALT 250 FOR IP): Performed by: FAMILY MEDICINE

## 2018-10-08 PROCEDURE — 6360000002 HC RX W HCPCS: Performed by: NURSE PRACTITIONER

## 2018-10-08 RX ORDER — ALBUTEROL SULFATE 2.5 MG/3ML
2.5 SOLUTION RESPIRATORY (INHALATION) EVERY 4 HOURS PRN
Status: DISCONTINUED | OUTPATIENT
Start: 2018-10-08 | End: 2018-10-10 | Stop reason: HOSPADM

## 2018-10-08 RX ORDER — FUROSEMIDE 10 MG/ML
INJECTION INTRAMUSCULAR; INTRAVENOUS
Status: COMPLETED
Start: 2018-10-08 | End: 2018-10-08

## 2018-10-08 RX ORDER — FUROSEMIDE 10 MG/ML
20 INJECTION INTRAMUSCULAR; INTRAVENOUS ONCE
Status: COMPLETED | OUTPATIENT
Start: 2018-10-08 | End: 2018-10-08

## 2018-10-08 RX ADMIN — INSULIN GLARGINE 10 UNITS: 100 INJECTION, SOLUTION SUBCUTANEOUS at 20:23

## 2018-10-08 RX ADMIN — FLUOXETINE 40 MG: 20 CAPSULE ORAL at 08:41

## 2018-10-08 RX ADMIN — INSULIN LISPRO 1 UNITS: 100 INJECTION, SOLUTION INTRAVENOUS; SUBCUTANEOUS at 12:06

## 2018-10-08 RX ADMIN — CEFTRIAXONE 1 G: 1 INJECTION, POWDER, FOR SOLUTION INTRAMUSCULAR; INTRAVENOUS at 22:35

## 2018-10-08 RX ADMIN — ENOXAPARIN SODIUM 40 MG: 40 INJECTION SUBCUTANEOUS at 08:41

## 2018-10-08 RX ADMIN — FUROSEMIDE 20 MG: 10 INJECTION INTRAMUSCULAR; INTRAVENOUS at 17:54

## 2018-10-08 RX ADMIN — LEVOTHYROXINE SODIUM 100 MCG: 100 TABLET ORAL at 06:30

## 2018-10-08 RX ADMIN — ASPIRIN 81 MG 81 MG: 81 TABLET ORAL at 08:41

## 2018-10-08 RX ADMIN — POTASSIUM CHLORIDE AND SODIUM CHLORIDE: 900; 150 INJECTION, SOLUTION INTRAVENOUS at 06:30

## 2018-10-08 RX ADMIN — INSULIN LISPRO 1 UNITS: 100 INJECTION, SOLUTION INTRAVENOUS; SUBCUTANEOUS at 08:41

## 2018-10-08 RX ADMIN — BENZTROPINE MESYLATE 0.5 MG: 1 TABLET ORAL at 20:23

## 2018-10-08 RX ADMIN — INSULIN LISPRO 1 UNITS: 100 INJECTION, SOLUTION INTRAVENOUS; SUBCUTANEOUS at 17:31

## 2018-10-08 RX ADMIN — POTASSIUM CHLORIDE 40 MEQ: 20 TABLET, EXTENDED RELEASE ORAL at 08:41

## 2018-10-08 RX ADMIN — POTASSIUM CHLORIDE AND SODIUM CHLORIDE: 900; 150 INJECTION, SOLUTION INTRAVENOUS at 16:32

## 2018-10-08 RX ADMIN — INSULIN LISPRO 1 UNITS: 100 INJECTION, SOLUTION INTRAVENOUS; SUBCUTANEOUS at 20:24

## 2018-10-08 RX ADMIN — FUROSEMIDE 20 MG: 10 INJECTION, SOLUTION INTRAMUSCULAR; INTRAVENOUS at 17:54

## 2018-10-08 RX ADMIN — ALBUTEROL SULFATE 2.5 MG: 2.5 SOLUTION RESPIRATORY (INHALATION) at 18:04

## 2018-10-08 ASSESSMENT — PAIN SCALES - GENERAL
PAINLEVEL_OUTOF10: 0

## 2018-10-08 NOTE — PROGRESS NOTES
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       Objective   Vision: Impaired  Vision Exceptions: Wears glasses for reading (recent decline with vision)  Hearing: Within functional limits    Orientation  Overall Orientation Status: Within Functional Limits     Standing Balance  Sit to stand: Stand by assistance  Stand to sit: Stand by assistance  ADL  LE Dressing: Independent  Toileting: Stand by assistance        Bed mobility  Supine to Sit: Stand by assistance  Sit to Supine: Stand by assistance  Scooting: Stand by assistance  Transfers  Sit to stand: Stand by assistance  Stand to sit: Stand by assistance     Cognition  Overall Cognitive Status: Exceptions  Following Commands: Follows one step commands consistently; Follows one step commands with increased time  Memory: Decreased recall of recent events;Decreased short term memory  Problem Solving: Assistance required to generate solutions;Assistance required to implement solutions  Initiation: Requires cues for some                 LUE AROM (degrees)  LUE AROM : WFL  Left Hand AROM (degrees)  Left Hand AROM: WFL  RUE AROM (degrees)  RUE AROM : WFL  Right Hand AROM (degrees)  Right Hand AROM: WFL  LUE Strength  Gross LUE Strength: WFL  RUE Strength  Gross RUE Strength: WFL                  Assessment   Performance deficits / Impairments: Decreased functional mobility ; Decreased cognition;Decreased high-level IADLs;Decreased balance;Decreased vision/visual deficit; Decreased safe awareness;Decreased ADL status; Decreased endurance  Treatment Diagnosis: general weakness  Prognosis: Good  Decision Making: Low Complexity  REQUIRES OT FOLLOW UP: Yes  Safety Devices  Safety Devices in place: Yes  Type of devices: Bed alarm in

## 2018-10-08 NOTE — PROGRESS NOTES
endurance         Plan   Plan  Times per week: 7  Times per day: Daily  Current Treatment Recommendations: Strengthening, ROM, Balance Training, Functional Mobility Training, Transfer Training, ADL/Self-care Training, Gait Training, Neuromuscular Re-education, Home Exercise Program  Safety Devices  Type of devices: All fall risk precautions in place, Bed alarm in place, Call light within reach, Gait belt, Patient at risk for falls, Left in bed, Nurse notified    G-Code  PT G-Codes  Functional Limitation: Mobility: Walking and moving around  Mobility: Walking and Moving Around Current Status (): At least 20 percent but less than 40 percent impaired, limited or restricted  Mobility: Walking and Moving Around Goal Status ():  At least 1 percent but less than 20 percent impaired, limited or restricted  OutComes Score                                           AM-PAC Score             Goals  Short term goals  Time Frame for Short term goals: 7 days  Short term goal 1: Assess functional ability, initiate HEP  Short term goal 2: Pt to perform bed mobility mod I to prepare for D/C  Short term goal 3: Pt to perform functional transfers in room with mod I to prepare for D/C  Short term goal 4: Pt to amb 48' with AD and mod I to prepare for D/C  Patient Goals   Patient goals : 'get stronger so I can go back home\"       Therapy Time   Individual Concurrent Group Co-treatment   Time In 1015         Time Out 1041         Minutes 26         Timed Code Treatment Minutes: 15 Minutes       Mame Mills, PT, DPT

## 2018-10-08 NOTE — PLAN OF CARE
Problem: Falls - Risk of:  Goal: Absence of physical injury  Absence of physical injury   Outcome: Ongoing      Problem: Urinary Elimination:  Goal: Ability to reestablish a normal urinary elimination pattern will improve - after catheter removal  Ability to reestablish a normal urinary elimination pattern will improve   Outcome: Ongoing

## 2018-10-09 ENCOUNTER — APPOINTMENT (OUTPATIENT)
Dept: GENERAL RADIOLOGY | Age: 63
DRG: 682 | End: 2018-10-09
Payer: MEDICARE

## 2018-10-09 LAB
ABSOLUTE EOS #: 0.1 K/UL (ref 0–0.4)
ABSOLUTE IMMATURE GRANULOCYTE: ABNORMAL K/UL (ref 0–0.3)
ABSOLUTE LYMPH #: 0.7 K/UL (ref 1–4.8)
ABSOLUTE MONO #: 0.2 K/UL (ref 0.1–1.2)
ANION GAP SERPL CALCULATED.3IONS-SCNC: 11 MMOL/L (ref 9–17)
BASOPHILS # BLD: 1 % (ref 0–1)
BASOPHILS ABSOLUTE: 0 K/UL (ref 0–0.2)
BUN BLDV-MCNC: 6 MG/DL (ref 8–23)
BUN/CREAT BLD: 8 (ref 9–20)
CALCIUM SERPL-MCNC: 10 MG/DL (ref 8.6–10.4)
CHLORIDE BLD-SCNC: 104 MMOL/L (ref 98–107)
CO2: 23 MMOL/L (ref 20–31)
CREAT SERPL-MCNC: 0.71 MG/DL (ref 0.5–0.9)
CULTURE: ABNORMAL
DIFFERENTIAL TYPE: ABNORMAL
EKG ATRIAL RATE: 132 BPM
EKG P AXIS: 55 DEGREES
EKG P-R INTERVAL: 122 MS
EKG Q-T INTERVAL: 324 MS
EKG QRS DURATION: 92 MS
EKG QTC CALCULATION (BAZETT): 480 MS
EKG R AXIS: 36 DEGREES
EKG T AXIS: 157 DEGREES
EKG VENTRICULAR RATE: 132 BPM
EOSINOPHILS RELATIVE PERCENT: 2 % (ref 1–7)
GFR AFRICAN AMERICAN: >60 ML/MIN
GFR NON-AFRICAN AMERICAN: >60 ML/MIN
GFR SERPL CREATININE-BSD FRML MDRD: ABNORMAL ML/MIN/{1.73_M2}
GFR SERPL CREATININE-BSD FRML MDRD: ABNORMAL ML/MIN/{1.73_M2}
GLUCOSE BLD-MCNC: 148 MG/DL (ref 70–99)
GLUCOSE BLD-MCNC: 172 MG/DL (ref 65–105)
GLUCOSE BLD-MCNC: 233 MG/DL (ref 65–105)
GLUCOSE BLD-MCNC: 91 MG/DL (ref 65–105)
HCT VFR BLD CALC: 36 % (ref 36–46)
HEMOGLOBIN: 11.8 G/DL (ref 12–16)
IMMATURE GRANULOCYTES: ABNORMAL %
LYMPHOCYTES # BLD: 13 % (ref 16–46)
Lab: ABNORMAL
MCH RBC QN AUTO: 31.5 PG (ref 26–34)
MCHC RBC AUTO-ENTMCNC: 32.9 G/DL (ref 31–37)
MCV RBC AUTO: 95.8 FL (ref 80–100)
MONOCYTES # BLD: 4 % (ref 4–11)
NRBC AUTOMATED: ABNORMAL PER 100 WBC
ORGANISM: ABNORMAL
PDW BLD-RTO: 13.6 % (ref 11–14.5)
PLATELET # BLD: 126 K/UL (ref 140–450)
PLATELET ESTIMATE: ABNORMAL
PMV BLD AUTO: 9.5 FL (ref 6–12)
POTASSIUM SERPL-SCNC: 4.7 MMOL/L (ref 3.7–5.3)
RBC # BLD: 3.76 M/UL (ref 4–5.2)
RBC # BLD: ABNORMAL 10*6/UL
SEG NEUTROPHILS: 80 % (ref 43–77)
SEGMENTED NEUTROPHILS ABSOLUTE COUNT: 4.3 K/UL (ref 1.8–7.7)
SODIUM BLD-SCNC: 138 MMOL/L (ref 135–144)
SPECIMEN DESCRIPTION: ABNORMAL
STATUS: ABNORMAL
WBC # BLD: 5.4 K/UL (ref 3.5–11)
WBC # BLD: ABNORMAL 10*3/UL

## 2018-10-09 PROCEDURE — 6370000000 HC RX 637 (ALT 250 FOR IP): Performed by: INTERNAL MEDICINE

## 2018-10-09 PROCEDURE — 93005 ELECTROCARDIOGRAM TRACING: CPT

## 2018-10-09 PROCEDURE — 94640 AIRWAY INHALATION TREATMENT: CPT

## 2018-10-09 PROCEDURE — 2060000000 HC ICU INTERMEDIATE R&B

## 2018-10-09 PROCEDURE — 94761 N-INVAS EAR/PLS OXIMETRY MLT: CPT

## 2018-10-09 PROCEDURE — 6360000002 HC RX W HCPCS: Performed by: INTERNAL MEDICINE

## 2018-10-09 PROCEDURE — 85025 COMPLETE CBC W/AUTO DIFF WBC: CPT

## 2018-10-09 PROCEDURE — 6370000000 HC RX 637 (ALT 250 FOR IP): Performed by: FAMILY MEDICINE

## 2018-10-09 PROCEDURE — 97110 THERAPEUTIC EXERCISES: CPT | Performed by: PHYSICAL THERAPY ASSISTANT

## 2018-10-09 PROCEDURE — 6370000000 HC RX 637 (ALT 250 FOR IP): Performed by: NURSE PRACTITIONER

## 2018-10-09 PROCEDURE — 6360000002 HC RX W HCPCS: Performed by: NURSE PRACTITIONER

## 2018-10-09 PROCEDURE — 99232 SBSQ HOSP IP/OBS MODERATE 35: CPT | Performed by: INTERNAL MEDICINE

## 2018-10-09 PROCEDURE — 71046 X-RAY EXAM CHEST 2 VIEWS: CPT

## 2018-10-09 PROCEDURE — 2580000003 HC RX 258: Performed by: NURSE PRACTITIONER

## 2018-10-09 PROCEDURE — 6360000002 HC RX W HCPCS: Performed by: FAMILY MEDICINE

## 2018-10-09 PROCEDURE — 82947 ASSAY GLUCOSE BLOOD QUANT: CPT

## 2018-10-09 PROCEDURE — 97116 GAIT TRAINING THERAPY: CPT | Performed by: PHYSICAL THERAPY ASSISTANT

## 2018-10-09 PROCEDURE — 80048 BASIC METABOLIC PNL TOTAL CA: CPT

## 2018-10-09 PROCEDURE — 2700000000 HC OXYGEN THERAPY PER DAY

## 2018-10-09 PROCEDURE — 36415 COLL VENOUS BLD VENIPUNCTURE: CPT

## 2018-10-09 RX ORDER — LOSARTAN POTASSIUM 50 MG/1
50 TABLET ORAL DAILY
Status: DISCONTINUED | OUTPATIENT
Start: 2018-10-09 | End: 2018-10-10 | Stop reason: HOSPADM

## 2018-10-09 RX ORDER — FUROSEMIDE 10 MG/ML
40 INJECTION INTRAMUSCULAR; INTRAVENOUS ONCE
Status: COMPLETED | OUTPATIENT
Start: 2018-10-09 | End: 2018-10-09

## 2018-10-09 RX ORDER — HYDROCHLOROTHIAZIDE 12.5 MG/1
12.5 TABLET ORAL DAILY
Status: DISCONTINUED | OUTPATIENT
Start: 2018-10-09 | End: 2018-10-10 | Stop reason: HOSPADM

## 2018-10-09 RX ORDER — FUROSEMIDE 10 MG/ML
20 INJECTION INTRAMUSCULAR; INTRAVENOUS 2 TIMES DAILY
Status: DISCONTINUED | OUTPATIENT
Start: 2018-10-09 | End: 2018-10-10 | Stop reason: HOSPADM

## 2018-10-09 RX ADMIN — LEVOTHYROXINE SODIUM 100 MCG: 100 TABLET ORAL at 05:27

## 2018-10-09 RX ADMIN — INSULIN LISPRO 1 UNITS: 100 INJECTION, SOLUTION INTRAVENOUS; SUBCUTANEOUS at 17:44

## 2018-10-09 RX ADMIN — INSULIN LISPRO 1 UNITS: 100 INJECTION, SOLUTION INTRAVENOUS; SUBCUTANEOUS at 09:35

## 2018-10-09 RX ADMIN — LOSARTAN POTASSIUM 50 MG: 50 TABLET ORAL at 11:47

## 2018-10-09 RX ADMIN — CEFTRIAXONE 1 G: 1 INJECTION, POWDER, FOR SOLUTION INTRAMUSCULAR; INTRAVENOUS at 22:26

## 2018-10-09 RX ADMIN — ASPIRIN 81 MG 81 MG: 81 TABLET ORAL at 09:34

## 2018-10-09 RX ADMIN — ACETAMINOPHEN 650 MG: 325 TABLET ORAL at 07:48

## 2018-10-09 RX ADMIN — FLUOXETINE 40 MG: 20 CAPSULE ORAL at 09:34

## 2018-10-09 RX ADMIN — BENZTROPINE MESYLATE 0.5 MG: 1 TABLET ORAL at 21:13

## 2018-10-09 RX ADMIN — ALBUTEROL SULFATE 2.5 MG: 2.5 SOLUTION RESPIRATORY (INHALATION) at 08:31

## 2018-10-09 RX ADMIN — POTASSIUM CHLORIDE AND SODIUM CHLORIDE: 900; 150 INJECTION, SOLUTION INTRAVENOUS at 03:15

## 2018-10-09 RX ADMIN — HYDROCHLOROTHIAZIDE 12.5 MG: 12.5 TABLET ORAL at 11:47

## 2018-10-09 RX ADMIN — FUROSEMIDE 20 MG: 10 INJECTION, SOLUTION INTRAMUSCULAR; INTRAVENOUS at 17:45

## 2018-10-09 RX ADMIN — Medication 10 ML: at 11:46

## 2018-10-09 RX ADMIN — INSULIN LISPRO 2 UNITS: 100 INJECTION, SOLUTION INTRAVENOUS; SUBCUTANEOUS at 11:48

## 2018-10-09 RX ADMIN — INSULIN LISPRO 3 UNITS: 100 INJECTION, SOLUTION INTRAVENOUS; SUBCUTANEOUS at 17:44

## 2018-10-09 RX ADMIN — Medication 10 ML: at 21:14

## 2018-10-09 RX ADMIN — ENOXAPARIN SODIUM 40 MG: 40 INJECTION SUBCUTANEOUS at 09:34

## 2018-10-09 RX ADMIN — FUROSEMIDE 40 MG: 10 INJECTION, SOLUTION INTRAMUSCULAR; INTRAVENOUS at 11:45

## 2018-10-09 RX ADMIN — INSULIN GLARGINE 10 UNITS: 100 INJECTION, SOLUTION SUBCUTANEOUS at 21:13

## 2018-10-09 RX ADMIN — POTASSIUM CHLORIDE 40 MEQ: 20 TABLET, EXTENDED RELEASE ORAL at 09:34

## 2018-10-09 RX ADMIN — Medication 10 ML: at 17:45

## 2018-10-09 RX ADMIN — ALBUTEROL SULFATE 2.5 MG: 2.5 SOLUTION RESPIRATORY (INHALATION) at 00:56

## 2018-10-09 ASSESSMENT — PAIN SCALES - GENERAL
PAINLEVEL_OUTOF10: 0

## 2018-10-09 NOTE — PROGRESS NOTES
FULL  More Ambulation?: No  Ambulation 1  Surface: level tile  Device: Rolling Walker  Assistance: Stand by assistance  Quality of Gait: decreased step/stride length, decreased carlos, decreased endurance  Distance: 20'x2  Comments: Returned to chair at conclusion of gait training. Dizziness reported, lightheadedness. Stairs/Curb  Stairs?: No  Neuromuscular Education  NDT Treatment: Gait   Balance  Posture: Fair  Sitting - Static: Fair;+  Sitting - Dynamic: Fair;+  Standing - Static: Fair  Standing - Dynamic: Fair  Exercises  Quad Sets: 20x  Gluteal Sets: 20x  Hip Abduction: 20x in supine. Knee Long Arc Quad: 10x  Ankle Pumps: 20x in supine and sitting. Comments: Manual resistance with seated hamstring curls. Assessment   Body structures, Functions, Activity limitations: Decreased functional mobility ; Decreased ADL status; Decreased strength;Decreased endurance  Prognosis: Fair  Decision Making: Low Complexity  REQUIRES PT FOLLOW UP: Yes  Activity Tolerance  Activity Tolerance: Patient Tolerated treatment well;Patient limited by fatigue;Patient limited by endurance; Patient limited by cognitive status     G-Code     OutComes Score                                                    AM-PAC Score             Goals  Short term goals  Time Frame for Short term goals: 7 days  Short term goal 1: Assess functional ability, initiate HEP  Short term goal 2: Pt to perform bed mobility mod I to prepare for D/C  Short term goal 3: Pt to perform functional transfers in room with mod I to prepare for D/C  Short term goal 4: Pt to amb 48' with AD and mod I to prepare for D/C  Patient Goals   Patient goals : 'get stronger so I can go back home\"    Plan    Plan  Times per week: 7  Times per day: Daily  Current Treatment Recommendations: Strengthening, ROM, Balance Training, Functional Mobility Training, Transfer Training, ADL/Self-care Training, Gait Training, Neuromuscular Re-education, Home Exercise Program  Safety Devices  Type of devices:  All fall risk precautions in place, Bed alarm in place, Call light within reach, Gait belt, Patient at risk for falls, Left in bed, Nurse notified  Restraints  Initially in place: No     Therapy Time   Individual Concurrent Group Co-treatment   Time In 1048         Time Out 1113         Minutes [address] [place], [place]

## 2018-10-09 NOTE — PROGRESS NOTES
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----diuretics IV and Cardiology to see---10.10.2018    HTN---needs at least home regimen reinitiated     Hypokalemia---corrected    COPD dyspnea--on supplemental oxygen    DM2---variable blood glucose levels    See note below     All other ROS negative except noted in HPI    Diet:  DIET CARDIAC; Carb Control: 4 carb choices (60 gms)/meal    Medications:  Scheduled Meds:   losartan  50 mg Oral Daily    hydrochlorothiazide  12.5 mg Oral Daily    furosemide  20 mg Intravenous BID    insulin glargine  10 Units Subcutaneous Nightly    potassium chloride  40 mEq Oral Daily with breakfast    aspirin  81 mg Oral Daily    levothyroxine  100 mcg Oral Daily    FLUoxetine  40 mg Oral Daily    sodium chloride flush  10 mL Intravenous 2 times per day    enoxaparin  40 mg Subcutaneous Daily    insulin lispro  0-6 Units Subcutaneous TID WC    insulin lispro  0-3 Units Subcutaneous Nightly    cefTRIAXone (ROCEPHIN) IV  1 g Intravenous Q24H    benztropine  0.5 mg Oral Nightly     Continuous Infusions:   dextrose       PRN Meds:albuterol, acetaminophen, sodium chloride flush, magnesium hydroxide, ondansetron, glucose, dextrose, glucagon (rDNA), dextrose    Objective:  Labs:  CBC with Differential:    Lab Results   Component Value Date    WBC 5.4 10/09/2018    RBC 3.76 10/09/2018    HGB 11.8 10/09/2018    HCT 36.0 correction  6.    See orders     Electronically signed by Timo Perez on 10/9/2018 at 12:36 PM    Hospitalist

## 2018-10-09 NOTE — PROGRESS NOTES
Nutrition Assessment    Type and Reason for Visit: Initial    Nutrition Recommendations: Pt. States difficulty swallowing food all the way and feels \"stuck\". Is requesting soft and liquid foods so will supplement intake with ONS to ensure proper calorie intake     Malnutrition Assessment:  · Malnutrition Status: Meets the criteria for moderate malnutrition  · Context: Chronic illness  · Findings of the 6 clinical characteristics of malnutrition (Minimum of 2 out of 6 clinical characteristics is required to make the diagnosis of moderate or severe Protein Calorie Malnutrition based on AND/ASPEN Guidelines):  1. Weight Loss-10% loss or greater, in 6 months    Nutrition Diagnosis:   · Problem: Inadequate oral intake  · Etiology: related to Early satiety     Signs and symptoms:  as evidenced by Weight loss greater than or equal to 10% in 6 months    Nutrition Assessment:  · Subjective Assessment: Pt. referred to nutrition due to unintended weight loss. Pt has a significant weight loss of 10% in 6 months. Pt. does not have any other risks of malnutrition at this time, so will be placed at moderate risk for malnutrition and will continue to monitor. · Current Nutrition Therapies:  · Oral Diet Orders: Carb Control 4 Carbs/Meal, Cardiac   · Oral Diet intake: 26-50%, 51-75%  · Oral Nutrition Supplement (ONS) Orders: None  · Anthropometric Measures:  · Ht: 5' 4\" (162.6 cm)   · Current Body Wt: 250 lb (113.4 kg)  · Usual Body Wt: 279 lb (126.6 kg)  · % Weight Change: 10%,  6 months  · Ideal Body Wt: 120 lb (54.4 kg), % Ideal Body 208%  · BMI Classification: BMI > or equal to 40.0 Obese Class III  · Comparative Standards (Estimated Nutrition Needs):  · Estimated Daily Total Kcal: 9092-8886  · Estimated Daily Protein (g): 170-220    Estimated Intake vs Estimated Needs: Intake Less Than Needs    Nutrition Risk Level:  Moderate    Nutrition Interventions:   Continue current diet, Start ONS       Nutrition Evaluation:

## 2018-10-10 ENCOUNTER — APPOINTMENT (OUTPATIENT)
Dept: GENERAL RADIOLOGY | Age: 63
DRG: 682 | End: 2018-10-10
Payer: MEDICARE

## 2018-10-10 VITALS
OXYGEN SATURATION: 94 % | HEIGHT: 64 IN | TEMPERATURE: 99.1 F | SYSTOLIC BLOOD PRESSURE: 122 MMHG | WEIGHT: 245.9 LBS | BODY MASS INDEX: 41.98 KG/M2 | DIASTOLIC BLOOD PRESSURE: 74 MMHG | HEART RATE: 74 BPM | RESPIRATION RATE: 20 BRPM

## 2018-10-10 LAB
ABSOLUTE EOS #: 0.2 K/UL (ref 0–0.4)
ABSOLUTE IMMATURE GRANULOCYTE: ABNORMAL K/UL (ref 0–0.3)
ABSOLUTE LYMPH #: 1.5 K/UL (ref 1–4.8)
ABSOLUTE MONO #: 1.4 K/UL (ref 0.1–1.2)
ANION GAP SERPL CALCULATED.3IONS-SCNC: 12 MMOL/L (ref 9–17)
BASOPHILS # BLD: 1 % (ref 0–1)
BASOPHILS ABSOLUTE: 0.1 K/UL (ref 0–0.2)
BUN BLDV-MCNC: 8 MG/DL (ref 8–23)
BUN/CREAT BLD: 11 (ref 9–20)
CALCIUM SERPL-MCNC: 10.5 MG/DL (ref 8.6–10.4)
CHLORIDE BLD-SCNC: 102 MMOL/L (ref 98–107)
CO2: 24 MMOL/L (ref 20–31)
CREAT SERPL-MCNC: 0.75 MG/DL (ref 0.5–0.9)
DIFFERENTIAL TYPE: ABNORMAL
EOSINOPHILS RELATIVE PERCENT: 2 % (ref 1–7)
GFR AFRICAN AMERICAN: >60 ML/MIN
GFR NON-AFRICAN AMERICAN: >60 ML/MIN
GFR SERPL CREATININE-BSD FRML MDRD: ABNORMAL ML/MIN/{1.73_M2}
GFR SERPL CREATININE-BSD FRML MDRD: ABNORMAL ML/MIN/{1.73_M2}
GLUCOSE BLD-MCNC: 104 MG/DL (ref 70–99)
GLUCOSE BLD-MCNC: 142 MG/DL (ref 65–105)
HCT VFR BLD CALC: 35.7 % (ref 36–46)
HEMOGLOBIN: 11.9 G/DL (ref 12–16)
IMMATURE GRANULOCYTES: ABNORMAL %
LYMPHOCYTES # BLD: 17 % (ref 16–46)
MCH RBC QN AUTO: 31.4 PG (ref 26–34)
MCHC RBC AUTO-ENTMCNC: 33.4 G/DL (ref 31–37)
MCV RBC AUTO: 94.1 FL (ref 80–100)
MONOCYTES # BLD: 16 % (ref 4–11)
NRBC AUTOMATED: ABNORMAL PER 100 WBC
PDW BLD-RTO: 13.3 % (ref 11–14.5)
PLATELET # BLD: 182 K/UL (ref 140–450)
PLATELET ESTIMATE: ABNORMAL
PMV BLD AUTO: 9.3 FL (ref 6–12)
POTASSIUM SERPL-SCNC: 3.5 MMOL/L (ref 3.7–5.3)
RBC # BLD: 3.79 M/UL (ref 4–5.2)
RBC # BLD: ABNORMAL 10*6/UL
SEG NEUTROPHILS: 64 % (ref 43–77)
SEGMENTED NEUTROPHILS ABSOLUTE COUNT: 5.4 K/UL (ref 1.8–7.7)
SODIUM BLD-SCNC: 138 MMOL/L (ref 135–144)
WBC # BLD: 8.5 K/UL (ref 3.5–11)
WBC # BLD: ABNORMAL 10*3/UL

## 2018-10-10 PROCEDURE — 80048 BASIC METABOLIC PNL TOTAL CA: CPT

## 2018-10-10 PROCEDURE — 94762 N-INVAS EAR/PLS OXIMTRY CONT: CPT

## 2018-10-10 PROCEDURE — 6370000000 HC RX 637 (ALT 250 FOR IP): Performed by: INTERNAL MEDICINE

## 2018-10-10 PROCEDURE — 6360000002 HC RX W HCPCS: Performed by: INTERNAL MEDICINE

## 2018-10-10 PROCEDURE — 97535 SELF CARE MNGMENT TRAINING: CPT

## 2018-10-10 PROCEDURE — 99222 1ST HOSP IP/OBS MODERATE 55: CPT | Performed by: INTERNAL MEDICINE

## 2018-10-10 PROCEDURE — 97116 GAIT TRAINING THERAPY: CPT | Performed by: PHYSICAL THERAPY ASSISTANT

## 2018-10-10 PROCEDURE — 6360000002 HC RX W HCPCS: Performed by: NURSE PRACTITIONER

## 2018-10-10 PROCEDURE — 99238 HOSP IP/OBS DSCHRG MGMT 30/<: CPT | Performed by: INTERNAL MEDICINE

## 2018-10-10 PROCEDURE — 97110 THERAPEUTIC EXERCISES: CPT | Performed by: PHYSICAL THERAPY ASSISTANT

## 2018-10-10 PROCEDURE — 85025 COMPLETE CBC W/AUTO DIFF WBC: CPT

## 2018-10-10 PROCEDURE — 94761 N-INVAS EAR/PLS OXIMETRY MLT: CPT

## 2018-10-10 PROCEDURE — 2580000003 HC RX 258: Performed by: NURSE PRACTITIONER

## 2018-10-10 PROCEDURE — 71046 X-RAY EXAM CHEST 2 VIEWS: CPT

## 2018-10-10 PROCEDURE — 82947 ASSAY GLUCOSE BLOOD QUANT: CPT

## 2018-10-10 PROCEDURE — 36415 COLL VENOUS BLD VENIPUNCTURE: CPT

## 2018-10-10 PROCEDURE — 6370000000 HC RX 637 (ALT 250 FOR IP): Performed by: NURSE PRACTITIONER

## 2018-10-10 RX ORDER — GREEN TEA/HOODIA GORDONII 315-12.5MG
1 CAPSULE ORAL 3 TIMES DAILY
Qty: 30 TABLET | Refills: 0 | COMMUNITY
Start: 2018-10-10 | End: 2018-10-20

## 2018-10-10 RX ORDER — SACCHAROMYCES BOULARDII 250 MG
250 CAPSULE ORAL 2 TIMES DAILY
Qty: 20 CAPSULE | Refills: 0 | COMMUNITY
Start: 2018-10-10 | End: 2018-10-20

## 2018-10-10 RX ORDER — POTASSIUM CHLORIDE 20 MEQ/1
40 TABLET, EXTENDED RELEASE ORAL ONCE
Status: COMPLETED | OUTPATIENT
Start: 2018-10-10 | End: 2018-10-10

## 2018-10-10 RX ORDER — FUROSEMIDE 20 MG/1
20 TABLET ORAL DAILY
Qty: 30 TABLET | Refills: 0 | Status: SHIPPED | OUTPATIENT
Start: 2018-10-10 | End: 2018-11-27 | Stop reason: ALTCHOICE

## 2018-10-10 RX ORDER — POTASSIUM CHLORIDE 20 MEQ/1
20 TABLET, EXTENDED RELEASE ORAL DAILY
Qty: 30 TABLET | Refills: 0 | Status: SHIPPED | OUTPATIENT
Start: 2018-10-10 | End: 2018-11-27 | Stop reason: ALTCHOICE

## 2018-10-10 RX ADMIN — FUROSEMIDE 20 MG: 10 INJECTION, SOLUTION INTRAMUSCULAR; INTRAVENOUS at 08:01

## 2018-10-10 RX ADMIN — ENOXAPARIN SODIUM 40 MG: 40 INJECTION SUBCUTANEOUS at 08:01

## 2018-10-10 RX ADMIN — ASPIRIN 81 MG 81 MG: 81 TABLET ORAL at 08:01

## 2018-10-10 RX ADMIN — POTASSIUM CHLORIDE 40 MEQ: 20 TABLET, EXTENDED RELEASE ORAL at 08:01

## 2018-10-10 RX ADMIN — INSULIN LISPRO 1 UNITS: 100 INJECTION, SOLUTION INTRAVENOUS; SUBCUTANEOUS at 11:54

## 2018-10-10 RX ADMIN — HYDROCHLOROTHIAZIDE 12.5 MG: 12.5 TABLET ORAL at 08:01

## 2018-10-10 RX ADMIN — LOSARTAN POTASSIUM 50 MG: 50 TABLET ORAL at 08:01

## 2018-10-10 RX ADMIN — POTASSIUM CHLORIDE 40 MEQ: 20 TABLET, EXTENDED RELEASE ORAL at 09:35

## 2018-10-10 RX ADMIN — INSULIN LISPRO 3 UNITS: 100 INJECTION, SOLUTION INTRAVENOUS; SUBCUTANEOUS at 08:02

## 2018-10-10 RX ADMIN — INSULIN LISPRO 3 UNITS: 100 INJECTION, SOLUTION INTRAVENOUS; SUBCUTANEOUS at 11:54

## 2018-10-10 RX ADMIN — LEVOTHYROXINE SODIUM 100 MCG: 100 TABLET ORAL at 06:09

## 2018-10-10 RX ADMIN — Medication 10 ML: at 08:01

## 2018-10-10 RX ADMIN — FLUOXETINE 40 MG: 20 CAPSULE ORAL at 08:01

## 2018-10-10 ASSESSMENT — PAIN SCALES - GENERAL
PAINLEVEL_OUTOF10: 0
PAINLEVEL_OUTOF10: 0

## 2018-10-10 NOTE — PROGRESS NOTES
reach          Plan   Plan  Times per week: 2    Goals  Short term goals  Time Frame for Short term goals: 2  Short term goal 1: Patient education for low vision solutions for increased safety  Short term goal 2: Patient to complete toilet transfer with Mod I using a/e as needed       Therapy Time   Individual Concurrent Group Co-treatment   Time In 1453         Time Out 1507         Minutes 14         Timed Code Treatment Minutes: [facility]

## 2018-10-10 NOTE — CONSULTS
Provider   losartan-hydrochlorothiazide (HYZAAR) 50-12.5 MG per tablet Take 1 tablet by mouth daily 10/3/18  Yes Kajal Chawla MD   aspirin 81 MG tablet Take 81 mg by mouth daily   Yes Historical Provider, MD   pravastatin (PRAVACHOL) 10 MG tablet  1/15/18  Yes Historical Provider, MD   levothyroxine (SYNTHROID) 100 MCG tablet  1/15/18  Yes Historical Provider, MD   FLUoxetine (PROZAC) 40 MG capsule  10/16/17  Yes Historical Provider, MD   benztropine (COGENTIN) 0.5 MG tablet  10/20/17  Yes Historical Provider, MD   metFORMIN (GLUCOPHAGE) 500 MG tablet Take 500 mg by mouth daily (with breakfast)   Yes Historical Provider, MD   topiramate (TOPAMAX) 25 MG tablet Take 25 mg by mouth 4 times daily   Yes Historical Provider, MD   Paliperidone Palmitate (INVEGA SUSTENNA IM) Inject into the muscle every 30 days    Historical Provider, MD       Allergies:  Patient has no known allergies. Social History:   reports that she quit smoking about 10 years ago. She has never used smokeless tobacco. She reports that she does not drink alcohol. Family History: family history is not on file. No h/o sudden cardiac death. No for premature CAD      REVIEW OF SYSTEMS:    · Constitutional: there has been no unanticipated weight loss. There's been No change in energy level, No change in activity level. · Eyes: No visual changes or diplopia. No scleral icterus. · ENT: No Headaches, hearing loss or vertigo. No mouth sores or sore throat. · Cardiovascular: As above. · Respiratory: As above. · Gastrointestinal: No abdominal pain, appetite loss, blood in stools. No change in bowel or bladder habits. · Genitourinary: No dysuria, trouble voiding, or hematuria. · Musculoskeletal:  No gait disturbance, No weakness or joint complaints. · Integumentary: No rash or pruritis. · Neurological: No headache, diplopia, change in muscle strength, numbness or tingling.  No change in gait, balance, coordination, mood, affect, memory,

## 2018-10-10 NOTE — PLAN OF CARE
Problem: IP MOBILITY  Goal: LTG - patient will demonstrate safe mobility requirements  Outcome: Ongoing

## 2018-10-10 NOTE — PLAN OF CARE
Problem: Falls - Risk of:  Goal: Will remain free from falls  Will remain free from falls   Outcome: Ongoing    Goal: Absence of physical injury  Absence of physical injury   Outcome: Ongoing      Problem: Sensory:  Goal: General experience of comfort will improve  General experience of comfort will improve   Outcome: Ongoing      Problem: Urinary Elimination:  Goal: Signs and symptoms of infection will decrease  Signs and symptoms of infection will decrease   Outcome: Ongoing    Goal: Ability to reestablish a normal urinary elimination pattern will improve - after catheter removal  Ability to reestablish a normal urinary elimination pattern will improve   Outcome: Ongoing    Goal: Complications related to the disease process, condition or treatment will be avoided or minimized  Complications related to the disease process, condition or treatment will be avoided or minimized   Outcome: Ongoing      Problem: IP MOBILITY  Goal: LTG - patient will demonstrate safe mobility requirements  Outcome: Ongoing

## 2018-10-10 NOTE — PROGRESS NOTES
Training, ADL/Self-care Training, Gait Training, Neuromuscular Re-education, Home Exercise Program  Safety Devices  Type of devices:  All fall risk precautions in place, Call light within reach, Gait belt, Patient at risk for falls, Nurse notified, Left in chair  Restraints  Initially in place: No     Therapy Time   Individual Concurrent Group Co-treatment   Time In 0827         Time Out 0851         Minutes [address] [place] [address]

## 2018-10-10 NOTE — FLOWSHEET NOTE
Merced Spence is doing better and anxious to get home. She lives with a daughter and has a granddaughter in [place] [address]. 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 She has lost a son several years ago and her mother a couple of years ago. He speech is disjointed and difficult to follow.      10/10/18 1004   Encounter Summary   Services provided to: Patient   Referral/Consult From: Rounding   Complexity of Encounter Low   Length of Encounter 15 minutes   Routine   Type Follow up   Assessment Approachable   Intervention Sustaining presence/ Ministry of presence   Outcome Engaged in conversation

## 2018-10-11 NOTE — DISCHARGE SUMMARY
ventricular  systolic function, normal aortic root, LVEF 50% to 55%. 9.  Hypertension. 10.  Hyperlipidemia. 11.  COPD. 12.  Hypothyroidism. 13.  GERD. 14.  Morbid obesity. 15.  Schizophrenia bipolar. 16.  Depression and anxiety. 16.  Possible coronary artery disease, abnormal stress test  03/06/2018, together with 2D echo as set forth above. The patient  seen by Cardiology this hospitalization. Cardiac catheterization  again recommended. 18.  Thrombocytopenia. 19. Moderate mild nutrition. 20.  Tobacco abuse, quit 01/23/2008. Other medical problems set forth in the progress note of 10/10/2018,  incorporated for reference herein. HISTORY OF PRESENT ILLNESS AND HOSPITAL COURSE:  The patient is a  46-year-old white female, patient of Homero Avila, Internal Medicine. The patient presented with multiplicity of issues, urinary tract  infection, treated with Rocephin, completed a course during this  hospitalization;     initial shakiness; blurry vision, cleared;  hypokalemia, replacement therapy is given;     acute kidney injury  superimposed on chronic kidney disease stage II, improved with IV  fluid. The patient has diabetes mellitus type 2, blood glucose level  is 104 around the time of discharge. The patient's dyspnea on  exertion together with sinus tachycardia and CHF appeared to be a  common theme for this patient. She was seen by Cardiology,  recommendation was that of cardiac catheterization, which has been  previously suggested to this patient. The patient's schizophrenia together with depression and anxiety,  controlled. Prior history of tobacco abuse, none recent. Thrombocytopenia now improved and with the platelet count back within  a normal range at the time of discharge, at 182. LABORATORY DATA:  Around the time of discharge, white cell count was  8.5, hemoglobin 11.9, hematocrit 35.7, and platelets 308,553.   Sodium  138, potassium 3.5 to 4.7, chloride 102, CO2

## 2018-10-13 LAB
CULTURE: NORMAL
CULTURE: NORMAL
Lab: NORMAL
Lab: NORMAL
SPECIMEN DESCRIPTION: NORMAL
SPECIMEN DESCRIPTION: NORMAL
STATUS: NORMAL
STATUS: NORMAL

## 2018-10-18 ENCOUNTER — OFFICE VISIT (OUTPATIENT)
Dept: CARDIOLOGY CLINIC | Age: 63
End: 2018-10-18
Payer: MEDICARE

## 2018-10-18 VITALS
HEIGHT: 64 IN | DIASTOLIC BLOOD PRESSURE: 62 MMHG | WEIGHT: 231 LBS | HEART RATE: 94 BPM | BODY MASS INDEX: 39.44 KG/M2 | SYSTOLIC BLOOD PRESSURE: 86 MMHG

## 2018-10-18 DIAGNOSIS — R06.00 DYSPNEA, UNSPECIFIED TYPE: Primary | ICD-10-CM

## 2018-10-18 PROCEDURE — 99213 OFFICE O/P EST LOW 20 MIN: CPT | Performed by: INTERNAL MEDICINE

## 2018-10-18 NOTE — PROGRESS NOTES
10/20/17  Yes Historical Provider, MD   metFORMIN (GLUCOPHAGE) 500 MG tablet Take 500 mg by mouth daily (with breakfast)   Yes Historical Provider, MD   topiramate (TOPAMAX) 25 MG tablet Take 25 mg by mouth 4 times daily   Yes Historical Provider, MD   Paliperidone Palmitate (INVEGA SUSTENNA IM) Inject into the muscle every 30 days   Yes Historical Provider, MD       Allergies:  Patient has no known allergies. Social History:   reports that she quit smoking about 10 years ago. She has never used smokeless tobacco. She reports that she does not drink alcohol. REVIEW OF SYSTEMS:  CONSTITUTIONAL:NEGATIVE  HEENT:NEG  Cardiovascular: No chest pain, Yes dyspnea on exertion, No palpitations. Lower extremity edema: No  RESPIRATORY: WALKER  GASTROINTESTINAL:  negative  GENITOURINARY:  negative  INTEGUMENT:  negative  MUSCULOSKELETAL:  positive for  pain  NEUROLOGICAL:  negative    PHYSICAL EXAM:      BP 86/62 (Site: Right Upper Arm)   Pulse 94   Ht 5' 4\" (1.626 m)   Wt 231 lb (104.8 kg)   BMI 39.65 kg/m²    HEENT: PERRL, no cervical lymphadenopathy. No masses palpable. Cardiovascular:  · The apical impulse is not displaced  · Heart  Sounds:RRR, HSM LSB  · Jugular venous pulsation Normal  · The carotid upstroke is NL  · Peripheral pulses are symmetrical and full  Respiratory: Good respiratory effort. On auscultation: clear to auscultation bilaterally  Abdomen:  · No masses or tenderness  · Bowel sounds present  Extremities:  ·  No Cyanosis or Clubbing  ·  Lower extremity edema: No  Skin: Warm and dry    Cardiac data:      Labs:     CBC: No results for input(s): WBC, HGB, HCT, PLT in the last 72 hours. BMP: No results for input(s): NA, K, CO2, BUN, CREATININE, LABGLOM, GLUCOSE in the last 72 hours. PT/INR: No results for input(s): PROTIME, INR in the last 72 hours.   FASTING LIPID PANEL:  Lab Results   Component Value Date    HDL 54 01/12/2018    LDLCALC 95 01/12/2018    TRIG 172 01/12/2018     LIVER PROFILE:No

## 2018-11-05 PROBLEM — N39.0 UTI (URINARY TRACT INFECTION): Status: RESOLVED | Noted: 2018-10-06 | Resolved: 2018-11-05

## 2018-11-27 ENCOUNTER — OFFICE VISIT (OUTPATIENT)
Dept: CARDIOLOGY CLINIC | Age: 63
End: 2018-11-27
Payer: MEDICARE

## 2018-11-27 VITALS
DIASTOLIC BLOOD PRESSURE: 80 MMHG | WEIGHT: 231 LBS | SYSTOLIC BLOOD PRESSURE: 106 MMHG | HEART RATE: 88 BPM | HEIGHT: 64 IN | BODY MASS INDEX: 39.44 KG/M2

## 2018-11-27 DIAGNOSIS — R06.00 DYSPNEA, UNSPECIFIED TYPE: Primary | ICD-10-CM

## 2018-11-27 PROCEDURE — 99213 OFFICE O/P EST LOW 20 MIN: CPT | Performed by: INTERNAL MEDICINE

## 2020-12-09 ENCOUNTER — HOSPITAL ENCOUNTER (OUTPATIENT)
Age: 65
Setting detail: SPECIMEN
Discharge: HOME | End: 2020-12-09
Payer: MEDICARE

## 2020-12-09 LAB
-: ABNORMAL
ALBUMIN SERPL-MCNC: 4.2 G/DL (ref 3.5–5.2)
ALBUMIN/GLOBULIN RATIO: 1.2 (ref 1–2.5)
ALP BLD-CCNC: 101 U/L (ref 35–104)
ALT SERPL-CCNC: 16 U/L (ref 5–33)
AMORPHOUS: ABNORMAL
ANION GAP SERPL CALCULATED.3IONS-SCNC: 9 MMOL/L (ref 9–17)
AST SERPL-CCNC: 22 U/L
BACTERIA: ABNORMAL
BILIRUB SERPL-MCNC: 0.33 MG/DL (ref 0.3–1.2)
BILIRUBIN URINE: NEGATIVE
BUN BLDV-MCNC: 12 MG/DL (ref 8–23)
BUN/CREAT BLD: 13 (ref 9–20)
CALCIUM SERPL-MCNC: 11.3 MG/DL (ref 8.6–10.4)
CASTS UA: ABNORMAL /LPF (ref 0–2)
CHLORIDE BLD-SCNC: 103 MMOL/L (ref 98–107)
CO2: 26 MMOL/L (ref 20–31)
COLOR: NORMAL
COMMENT UA: NORMAL
CREAT SERPL-MCNC: 0.93 MG/DL (ref 0.5–0.9)
CRYSTALS, UA: ABNORMAL /HPF
EPITHELIAL CELLS UA: ABNORMAL /HPF (ref 0–5)
ESTIMATED AVERAGE GLUCOSE: 154 MG/DL
GFR AFRICAN AMERICAN: >60 ML/MIN
GFR NON-AFRICAN AMERICAN: >60 ML/MIN
GFR SERPL CREATININE-BSD FRML MDRD: ABNORMAL ML/MIN/{1.73_M2}
GFR SERPL CREATININE-BSD FRML MDRD: ABNORMAL ML/MIN/{1.73_M2}
GLUCOSE BLD-MCNC: 141 MG/DL (ref 70–99)
GLUCOSE URINE: NEGATIVE
HBA1C MFR BLD: 7 % (ref 4.8–5.9)
KETONES, URINE: NEGATIVE
LEUKOCYTE ESTERASE, URINE: NEGATIVE
MUCUS: ABNORMAL
NITRITE, URINE: NEGATIVE
OTHER OBSERVATIONS UA: ABNORMAL
PH UA: 5.5 (ref 5–6)
POTASSIUM SERPL-SCNC: 4.3 MMOL/L (ref 3.7–5.3)
PROTEIN UA: NEGATIVE
RBC UA: ABNORMAL /HPF (ref 0–4)
RENAL EPITHELIAL, UA: ABNORMAL /HPF
SODIUM BLD-SCNC: 138 MMOL/L (ref 135–144)
SPECIFIC GRAVITY UA: 1.02 (ref 1.01–1.02)
TOTAL PROTEIN: 7.7 G/DL (ref 6.4–8.3)
TRICHOMONAS: ABNORMAL
TURBIDITY: NORMAL
URINE HGB: NEGATIVE
UROBILINOGEN, URINE: NORMAL
WBC UA: ABNORMAL /HPF (ref 0–4)
YEAST: ABNORMAL

## 2020-12-09 PROCEDURE — 82043 UR ALBUMIN QUANTITATIVE: CPT

## 2020-12-09 PROCEDURE — 80053 COMPREHEN METABOLIC PANEL: CPT

## 2020-12-09 PROCEDURE — 83036 HEMOGLOBIN GLYCOSYLATED A1C: CPT

## 2020-12-09 PROCEDURE — 82306 VITAMIN D 25 HYDROXY: CPT

## 2020-12-09 PROCEDURE — 81001 URINALYSIS AUTO W/SCOPE: CPT

## 2020-12-09 PROCEDURE — 36415 COLL VENOUS BLD VENIPUNCTURE: CPT

## 2020-12-09 PROCEDURE — 82570 ASSAY OF URINE CREATININE: CPT

## 2020-12-10 ENCOUNTER — HOSPITAL ENCOUNTER (OUTPATIENT)
Age: 65
Setting detail: SPECIMEN
Discharge: HOME | End: 2020-12-10
Payer: MEDICARE

## 2020-12-10 LAB
CREATININE URINE: 188 MG/DL (ref 28–217)
MICROALBUMIN/CREAT 24H UR: 19 MG/L
MICROALBUMIN/CREAT UR-RTO: 10 MCG/MG CREAT
VITAMIN D 25-HYDROXY: 26.9 NG/ML (ref 30–100)

## 2020-12-10 PROCEDURE — 82340 ASSAY OF CALCIUM IN URINE: CPT

## 2020-12-12 LAB
CALCIUM TIMED UR: NORMAL MG/X H
CALCIUM URINE: 2.4 MG/DL
CALCIUM, URINE: 48 MG/24 H (ref 20–275)
HOURS COLLECTED: 24 H
VOLUME: 2020 ML

## 2021-07-19 ENCOUNTER — OFFICE VISIT (OUTPATIENT)
Dept: FAMILY MEDICINE CLINIC | Age: 66
End: 2021-07-19
Payer: MEDICARE

## 2021-07-19 VITALS
HEIGHT: 64 IN | DIASTOLIC BLOOD PRESSURE: 86 MMHG | SYSTOLIC BLOOD PRESSURE: 138 MMHG | WEIGHT: 257.4 LBS | OXYGEN SATURATION: 95 % | BODY MASS INDEX: 43.94 KG/M2 | HEART RATE: 101 BPM

## 2021-07-19 DIAGNOSIS — R32 URINARY INCONTINENCE, UNSPECIFIED TYPE: Primary | ICD-10-CM

## 2021-07-19 DIAGNOSIS — R03.0 ELEVATED BLOOD PRESSURE READING: ICD-10-CM

## 2021-07-19 LAB
BILIRUBIN, POC: NORMAL
BLOOD URINE, POC: NORMAL
CLARITY, POC: CLEAR
COLOR, POC: YELLOW
GLUCOSE URINE, POC: NORMAL
KETONES, POC: NORMAL
LEUKOCYTE EST, POC: NORMAL
NITRITE, POC: NORMAL
PH, POC: 6
PROTEIN, POC: NORMAL
SPECIFIC GRAVITY, POC: 1.02
UROBILINOGEN, POC: 0.2

## 2021-07-19 PROCEDURE — 1036F TOBACCO NON-USER: CPT | Performed by: NURSE PRACTITIONER

## 2021-07-19 PROCEDURE — 81002 URINALYSIS NONAUTO W/O SCOPE: CPT | Performed by: NURSE PRACTITIONER

## 2021-07-19 PROCEDURE — 99203 OFFICE O/P NEW LOW 30 MIN: CPT | Performed by: NURSE PRACTITIONER

## 2021-07-19 PROCEDURE — 3017F COLORECTAL CA SCREEN DOC REV: CPT | Performed by: NURSE PRACTITIONER

## 2021-07-19 PROCEDURE — G8427 DOCREV CUR MEDS BY ELIG CLIN: HCPCS | Performed by: NURSE PRACTITIONER

## 2021-07-19 PROCEDURE — 99204 OFFICE O/P NEW MOD 45 MIN: CPT | Performed by: NURSE PRACTITIONER

## 2021-07-19 PROCEDURE — G8417 CALC BMI ABV UP PARAM F/U: HCPCS | Performed by: NURSE PRACTITIONER

## 2021-07-19 RX ORDER — LEVOTHYROXINE SODIUM 112 UG/1
TABLET ORAL
COMMUNITY
Start: 2021-07-15

## 2021-07-19 RX ORDER — BLOOD PRESSURE TEST KIT
1 KIT MISCELLANEOUS ONCE
Qty: 1 KIT | Refills: 0 | Status: SHIPPED | OUTPATIENT
Start: 2021-07-19 | End: 2022-04-06

## 2021-07-19 RX ORDER — RISPERIDONE 1 MG/1
TABLET, FILM COATED ORAL
COMMUNITY
Start: 2021-06-07

## 2021-07-19 RX ORDER — LAMOTRIGINE 100 MG/1
100 TABLET ORAL DAILY
COMMUNITY
End: 2022-04-06

## 2021-07-19 RX ORDER — TOLTERODINE 2 MG/1
2 CAPSULE, EXTENDED RELEASE ORAL DAILY
Qty: 30 CAPSULE | Refills: 0 | Status: SHIPPED | OUTPATIENT
Start: 2021-07-19 | End: 2022-04-06

## 2021-07-19 ASSESSMENT — ENCOUNTER SYMPTOMS: SHORTNESS OF BREATH: 0

## 2021-07-19 NOTE — PROGRESS NOTES
[address], APRN-CNP  [address]  Phone:  [phone]  Fax:  [phone]  Justice Ashraf is a 63 y.o. female who presents today for her medical conditions/complaints as noted below. Justice Ashraf c/o of Hypertension (was just at her mental health care doctor and her bp was 170/118- and they suggest UC) and Dysuria (frequency, - incontinence)      HPI:     Hypertension  This is a new problem. The current episode started today. Pertinent negatives include no chest pain or shortness of breath. Risk factors for coronary artery disease include obesity, sedentary lifestyle and post-menopausal state. Incontinence. Patient is suffering from frequent incontinence. She often leaves her wet underwear lying throughout the house. She drinks a high volume of pop daily, she also drinks a fair amount of water daily. High blood pressure at mental health appointment.    Frequency and incontinence   Wt Readings from Last 3 Encounters:   07/19/21 257 lb 6.4 oz (116.8 kg)   11/27/18 231 lb (104.8 kg)   10/18/18 231 lb (104.8 kg)       Temp Readings from Last 3 Encounters:   10/10/18 99.1 °F (37.3 °C) (Oral)       BP Readings from Last 3 Encounters:   07/19/21 138/86   11/27/18 106/80   10/18/18 86/62       Pulse Readings from Last 3 Encounters:   07/19/21 101   11/27/18 88   10/18/18 94              Past Medical History:   Diagnosis Date    Abnormal cardiovascular stress test 03/06/2018    declined cardiac cath or beta blocker    Abnormal echocardiogram 01/18/2018    EF 41-89%, grade 1 Diastolic dysfunction    Anxiety     Depression     Dyspnea on exertion     GERD (gastroesophageal reflux disease)     Hyperlipidemia     Iron deficiency anemia     Schizoaffective disorder, bipolar type (HCC)     [facility] [facility]     Type 2 diabetes mellitus without complication ([facility]     Uterine cancer ([facility] [address]       Past Surgical History:   Procedure Laterality Date    COLONOSCOPY      HYSTERECTOMY, TOTAL ABDOMINAL       History reviewed. No pertinent family history. Social History     Tobacco Use    Smoking status: Former Smoker     Quit date: 2008     Years since quittin.4    Smokeless tobacco: Never Used   Substance Use Topics    Alcohol use: No      Current Outpatient Medications   Medication Sig Dispense Refill    levothyroxine (SYNTHROID) 112 MCG tablet TAKE 1 TABLET BY MOUTH ONCE DAILY ON AN EMPTY STOMACH      risperiDONE (RISPERDAL) 1 MG tablet TAKE 1 2 (ONE HALF) TABLET BY MOUTH TWICE DAILY FOR 2 DAYS AND THEN TAKE 1 TABLET TWICE DAILY FOR 2 DAYS AND THEN TAKE 1 TABLET IN THE MORNI      lamoTRIgine (LAMICTAL) 100 MG tablet Take 100 mg by mouth daily      tolterodine (DETROL LA) 2 MG extended release capsule Take 1 capsule by mouth daily 30 capsule 0    Blood Pressure KIT 1 kit by Does not apply route once for 1 dose 1 kit 0    aspirin 81 MG tablet Take 81 mg by mouth daily      pravastatin (PRAVACHOL) 10 MG tablet Take 10 mg by mouth daily       levothyroxine (SYNTHROID) 100 MCG tablet       FLUoxetine (PROZAC) 40 MG capsule Take 40 mg by mouth daily       metFORMIN (GLUCOPHAGE) 500 MG tablet Take 500 mg by mouth daily (with breakfast)      topiramate (TOPAMAX) 25 MG tablet Take 25 mg by mouth 4 times daily      benztropine (COGENTIN) 0.5 MG tablet Take 0.5 mg by mouth daily  (Patient not taking: Reported on 2021)      Paliperidone Palmitate (INVEGA SUSTENNA IM) Inject into the muscle every 30 days (Patient not taking: Reported on 2021)       No current facility-administered medications for this visit. No Known Allergies    No exam data present    Subjective:      Review of Systems   Respiratory: Negative for shortness of breath. Cardiovascular: Negative for chest pain. Frequency and incontinence.     Objective:     /86   Pulse 101   Ht 5' 4\" (1.626 m)   Wt 257 lb 6.4 oz (116.8 kg)   SpO2 95%   BMI 44.18 kg/m² Physical Exam  Vitals reviewed. Exam conducted with a chaperone present. Constitutional:       Appearance: She is obese. HENT:      Head: Normocephalic. Pulmonary:      Effort: Pulmonary effort is normal.   Neurological:      Mental Status: She is alert. Comments: Tremoring noted. Assessment:      Diagnosis Orders   1. Urinary incontinence, unspecified type  tolterodine (DETROL LA) 2 MG extended release capsule   2. Elevated blood pressure reading  Blood Pressure KIT     No results found for this visit on 07/19/21. Plan:       Recommend daily blood pressure check in the morning. Recommend patient find a new primary provider. Recommend blood sugar be tested each morning upon awakening. We will do a trial of Detrol LA for incontinence. Daughter to contact me in 1 month regarding efficacy of the Detrol LA. At that point we can titrate dose if needed. Offered primary care provider at this location. Patient Instructions     Medication as directed. Follow up with primary care provider in 1 to 2 days if needed. Patient Education        Bladder Training: Care Instructions  Your Care Instructions     Bladder training is used to treat urge incontinence and stress incontinence. Urge incontinence means that the need to urinate comes on so fast that you can't get to a toilet in time. Stress incontinence means that you leak urine because of pressure on your bladder. For example, it may happen when you laugh, cough, or lift something heavy. Bladder training can increase how long you can wait before you have to urinate. It can also help your bladder hold more urine. And it can give you better control over the urge to urinate. It is important to remember that bladder training takes a few weeks to a few months to make a difference. You may not see results right away, but don't give up. Follow-up care is a key part of your treatment and safety.  Be sure to make and go to all appointments, and call your doctor if you are having problems. It's also a good idea to know your test results and keep a list of the medicines you take. How can you care for yourself at home? Work with your doctor to come up with a bladder training program that is right for you. You may use one or more of the following methods. Delayed urination  · In the beginning, try to keep from urinating for 5 minutes after you first feel the need to go. · While you wait, take deep, slow breaths to relax. Kegel exercises can also help you delay the need to go to the bathroom. · After some practice, when you can easily wait 5 minutes to urinate, try to wait 10 minutes before you urinate. · Slowly increase the waiting period until you are able to control when you have to urinate. Scheduled urination  · Empty your bladder when you first wake up in the morning. · Schedule times throughout the day when you will urinate. · Start by going to the bathroom every hour, even if you don't need to go. · Slowly increase the time between trips to the bathroom. · When you have found a schedule that works well for you, keep doing it. · If you wake up during the night and have to urinate, do it. Apply your schedule to waking hours only. Kegel exercises  These tighten and strengthen pelvic muscles, which can help you control the flow of urine. To do Kegel exercises:  · Squeeze the same muscles you would use to stop your urine. Your belly and thighs should not move. · Hold the squeeze for 3 seconds, and then relax for 3 seconds. · Start with 3 seconds. Then add 1 second each week until you are able to squeeze for 10 seconds. · Repeat the exercise 10 to 15 times a session. Do three or more sessions a day. When should you call for help? Watch closely for changes in your health, and be sure to contact your doctor if:    · Your incontinence is getting worse.     · You do not get better as expected. Where can you learn more?   Go to https://[email or web address] org and sign in to your [employer] account. Enter D919 in the [employer] box to learn more about \"Bladder Training: Care Instructions. \"     If you do not have an account, please click on the \"Sign Up Now\" link. Current as of: February 10, 2021               Content Version: 12.9  © 2006-2021 [employer]. Care instructions adapted under license by [facility] ([facility]). If you have questions about a medical condition or this instruction, always ask your healthcare professional. [place] [address] any warranty or liability for your use of this information. Patient Education        Stress Incontinence in Women: Care Instructions  Your Care Instructions  Stress incontinence is the accidental release of urine caused by activities that put pressure on your bladder. It may happen most often when you sneeze, cough, laugh, jog, or lift something heavy. This condition does not cause major health problems, but it can be embarrassing and interfere with your life. Treatment can cure or improve your symptoms. Follow-up care is a key part of your treatment and safety. Be sure to make and go to all appointments, and call your doctor if you are having problems. It's also a good idea to know your test results and keep a list of the medicines you take. How can you care for yourself at home? · Take your medicines exactly as prescribed. Call your doctor if you think you are having a problem with your medicine. · Limit caffeine and alcohol. They make you urinate more. · Do pelvic floor (Kegel) exercises, which tighten and strengthen pelvic muscles. To do Kegel exercises:  ? Squeeze the same muscles you would use to stop your urine. Your belly and thighs should not move. ? Hold the squeeze for 3 seconds, and then relax for 3 seconds. ? Start with 3 seconds. Then add 1 second each week until you are able to squeeze for 10 seconds.   ? Repeat the exercise 10 to 15 times a session. Do three or more sessions a day. · Try wearing pads that absorb leaks. Or you may want to try to prevent leaks with a product like Poise Impressa, which you insert like a tampon. · Keep skin in the genital area dry. Petroleum jelly (like Vaseline) spread on the area may help protect your skin. When should you call for help? Call your doctor now or seek immediate medical care if:    · You have new urinary symptoms. These may include leaking urine, having pain when urinating, or feeling like you need to urinate often. Watch closely for changes in your health, and be sure to contact your doctor if:    · You do not get better as expected. Where can you learn more? Go to https://Fifteen Reasons.Xceliant. org and sign in to your [employer] account. Enter H623 in the [employer] box to learn more about \"Stress Incontinence in Women: Care Instructions. \"     If you do not have an account, please click on the \"Sign Up Now\" link. Current as of: February 11, 2021               Content Version: 12.9  © 2349-2601 Healthwise, [employer]. Care instructions adapted under license by [facility] ([facility]). If you have questions about a medical condition or this instruction, always ask your healthcare professional. [place] [address] any warranty or liability for your use of this information. Patient/Caregiver instructed on use, benefit, and side effects of prescribed medications. All patient/parent/caregiver questions answered. Patient/parent/caregiver voiced understanding. Reviewed health maintenance. Instructed to continue current medications, diet and exercise. Patient agreed with treatment plan. Follow up as directed.            Electronically signed by EMILIE Zuluaga NP on7/19/2021

## 2021-07-19 NOTE — PATIENT INSTRUCTIONS
Medication as directed. Follow up with primary care provider in 1 to 2 days if needed. Patient Education        Bladder Training: Care Instructions  Your Care Instructions     Bladder training is used to treat urge incontinence and stress incontinence. Urge incontinence means that the need to urinate comes on so fast that you can't get to a toilet in time. Stress incontinence means that you leak urine because of pressure on your bladder. For example, it may happen when you laugh, cough, or lift something heavy. Bladder training can increase how long you can wait before you have to urinate. It can also help your bladder hold more urine. And it can give you better control over the urge to urinate. It is important to remember that bladder training takes a few weeks to a few months to make a difference. You may not see results right away, but don't give up. Follow-up care is a key part of your treatment and safety. Be sure to make and go to all appointments, and call your doctor if you are having problems. It's also a good idea to know your test results and keep a list of the medicines you take. How can you care for yourself at home? Work with your doctor to come up with a bladder training program that is right for you. You may use one or more of the following methods. Delayed urination  · In the beginning, try to keep from urinating for 5 minutes after you first feel the need to go. · While you wait, take deep, slow breaths to relax. Kegel exercises can also help you delay the need to go to the bathroom. · After some practice, when you can easily wait 5 minutes to urinate, try to wait 10 minutes before you urinate. · Slowly increase the waiting period until you are able to control when you have to urinate. Scheduled urination  · Empty your bladder when you first wake up in the morning. · Schedule times throughout the day when you will urinate.   · Start by going to the bathroom every hour, even if you don't need to go. · Slowly increase the time between trips to the bathroom. · When you have found a schedule that works well for you, keep doing it. · If you wake up during the night and have to urinate, do it. Apply your schedule to waking hours only. Kegel exercises  These tighten and strengthen pelvic muscles, which can help you control the flow of urine. To do Kegel exercises:  · Squeeze the same muscles you would use to stop your urine. Your belly and thighs should not move. · Hold the squeeze for 3 seconds, and then relax for 3 seconds. · Start with 3 seconds. Then add 1 second each week until you are able to squeeze for 10 seconds. · Repeat the exercise 10 to 15 times a session. Do three or more sessions a day. When should you call for help? Watch closely for changes in your health, and be sure to contact your doctor if:    · Your incontinence is getting worse.     · You do not get better as expected. Where can you learn more? Go to https://G[employer]elle [employer]ductor.y prime. org and sign in to your AboutUs.org account. Enter H459 in the [employer] box to learn more about \"Bladder Training: Care Instructions. \"     If you do not have an account, please click on the \"Sign Up Now\" link. Current as of: February 10, 2021               Content Version: 12.9  © 7960-9470 Healthwise, Incorporated. Care instructions adapted under license by [facility] ([facility]). If you have questions about a medical condition or this instruction, always ask your healthcare professional. [place] [address] any warranty or liability for your use of this information. Patient Education        Stress Incontinence in Women: Care Instructions  Your Care Instructions  Stress incontinence is the accidental release of urine caused by activities that put pressure on your bladder. It may happen most often when you sneeze, cough, laugh, jog, or lift something heavy.  This condition does not cause major health problems, but it can be embarrassing and interfere with your life. Treatment can cure or improve your symptoms. Follow-up care is a key part of your treatment and safety. Be sure to make and go to all appointments, and call your doctor if you are having problems. It's also a good idea to know your test results and keep a list of the medicines you take. How can you care for yourself at home? · Take your medicines exactly as prescribed. Call your doctor if you think you are having a problem with your medicine. · Limit caffeine and alcohol. They make you urinate more. · Do pelvic floor (Kegel) exercises, which tighten and strengthen pelvic muscles. To do Kegel exercises:  ? Squeeze the same muscles you would use to stop your urine. Your belly and thighs should not move. ? Hold the squeeze for 3 seconds, and then relax for 3 seconds. ? Start with 3 seconds. Then add 1 second each week until you are able to squeeze for 10 seconds. ? Repeat the exercise 10 to 15 times a session. Do three or more sessions a day. · Try wearing pads that absorb leaks. Or you may want to try to prevent leaks with a product like Poise Impressa, which you insert like a tampon. · Keep skin in the genital area dry. Petroleum jelly (like Vaseline) spread on the area may help protect your skin. When should you call for help? Call your doctor now or seek immediate medical care if:    · You have new urinary symptoms. These may include leaking urine, having pain when urinating, or feeling like you need to urinate often. Watch closely for changes in your health, and be sure to contact your doctor if:    · You do not get better as expected. Where can you learn more? Go to https://Mailjetevelyneeb.OncoGenex. org and sign in to your [employer] account. Enter K804 in the "[employer] box to learn more about \"Stress Incontinence in Women: Care Instructions. \"     If you do not have an account, please click on the \"Sign Up Now\" link.  Current as of: February 11, 2021               Content Version: 12.9  © 7009-3656 Healthwise, Incorporated. Care instructions adapted under license by [facility] ([facility]). If you have questions about a medical condition or this instruction, always ask your healthcare professional. [address] any warranty or liability for your use of this information.

## 2022-02-24 ENCOUNTER — TELEPHONE (OUTPATIENT)
Dept: UROLOGY | Age: 67
End: 2022-02-24

## 2022-02-24 NOTE — TELEPHONE ENCOUNTER
Received referral to urology from [facility] office. Left message for patient to return phone call to set up new patient appointment.   Scheduled for 4/6/22 at 8:30 AM.

## 2022-02-24 NOTE — TELEPHONE ENCOUNTER
See previous telephone encounter from Dr Noemi Judge nurse. Writer mailed letter to patient to call our office to schedule/confirm an appointment.

## 2022-04-01 NOTE — PATIENT INSTRUCTIONS
If you need to reach the Urologist or Urology Nurses for any health care questions or concerns please call [phone] and ask to speak with the [facility] Urology Nurse. The phone line is open from [phone] Monday thru Friday.

## 2022-04-06 ENCOUNTER — OFFICE VISIT (OUTPATIENT)
Dept: UROLOGY | Age: 67
End: 2022-04-06
Payer: MEDICARE

## 2022-04-06 VITALS
RESPIRATION RATE: 20 BRPM | SYSTOLIC BLOOD PRESSURE: 138 MMHG | HEART RATE: 86 BPM | OXYGEN SATURATION: 95 % | HEIGHT: 64 IN | DIASTOLIC BLOOD PRESSURE: 86 MMHG | WEIGHT: 257 LBS | BODY MASS INDEX: 43.87 KG/M2

## 2022-04-06 DIAGNOSIS — E66.01 OBESITY, CLASS III, BMI 40-49.9 (MORBID OBESITY) (HCC): ICD-10-CM

## 2022-04-06 DIAGNOSIS — N39.46 MIXED STRESS AND URGE URINARY INCONTINENCE: Primary | ICD-10-CM

## 2022-04-06 PROCEDURE — G8417 CALC BMI ABV UP PARAM F/U: HCPCS | Performed by: UROLOGY

## 2022-04-06 PROCEDURE — 99204 OFFICE O/P NEW MOD 45 MIN: CPT | Performed by: UROLOGY

## 2022-04-06 PROCEDURE — 3017F COLORECTAL CA SCREEN DOC REV: CPT | Performed by: UROLOGY

## 2022-04-06 PROCEDURE — G8427 DOCREV CUR MEDS BY ELIG CLIN: HCPCS | Performed by: UROLOGY

## 2022-04-06 PROCEDURE — 1036F TOBACCO NON-USER: CPT | Performed by: UROLOGY

## 2022-04-06 RX ORDER — LISINOPRIL 10 MG/1
TABLET ORAL
COMMUNITY
Start: 2022-03-28

## 2022-04-06 RX ORDER — TOLTERODINE 4 MG/1
4 CAPSULE, EXTENDED RELEASE ORAL DAILY
Qty: 30 CAPSULE | Refills: 6 | Status: SHIPPED | OUTPATIENT
Start: 2022-04-06

## 2022-04-06 NOTE — PROGRESS NOTES
Dr. Fernanda Pena MD MD  [facility]rology Clinic Consultation / New Patient Visit    Patient:  Johnathon Comer  YOB: 1955  Date: 4/6/2022  Consult requested from [facility], MD     HISTORY OF PRESENT ILLNESS:   The patient is a 61 y.o. female who presents today for follow-up for the following problem(s): Urinary frequency urgency and incontinenece  Overall the problem(s) : are worsening. Associated Symptoms: No dysuria, gross hematuria. Pain Severity:      Today visit:   4/6/22   Has new onset of mixed urinary incontinence, with urge incontinence. 1 Brief / Day, damp. Bladder irritants: diet pepsi (2L). Vol: 3 bottles water     Summary of old records:   (Patient's old records, notes and chart reviewed and summarized above.)    Urinalysis today:  No results found for this visit on 04/06/22. Last BUN and creatinine:  Lab Results   Component Value Date    BUN 12 12/09/2020     Lab Results   Component Value Date    CREATININE 0.93 (H) 12/09/2020       Imaging Reviewed during this Office Visit:   (results were independently reviewed by physician and radiology report verified)    PAST MEDICAL, FAMILY AND SOCIAL HISTORY:  Past Medical History:   Diagnosis Date    Abnormal cardiovascular stress test 03/06/2018    declined cardiac cath or beta blocker    Abnormal echocardiogram 01/18/2018    EF 45-54%, grade 1 Diastolic dysfunction    Anxiety     Depression     Dyspnea on exertion     GERD (gastroesophageal reflux disease)     Hyperlipidemia     Iron deficiency anemia     Schizoaffective disorder, bipolar type ([address]     Schizophrenia ([address]     Type 2 diabetes mellitus without complication ([address]     Uterine cancer ([address]      Past Surgical History:   Procedure Laterality Date    COLONOSCOPY      HYSTERECTOMY, TOTAL ABDOMINAL       No family history on file.   Outpatient Medications Marked as Taking for the 4/6/22 encounter (Office Visit) with Marielos Pickard MD   Medication Sig Dispense Refill    lisinopril (PRINIVIL;ZESTRIL) 10 MG tablet       levothyroxine (SYNTHROID) 112 MCG tablet TAKE 1 TABLET BY MOUTH ONCE DAILY ON AN EMPTY STOMACH      risperiDONE (RISPERDAL) 1 MG tablet TAKE 1 2 (ONE HALF) TABLET BY MOUTH TWICE DAILY FOR 2 DAYS AND THEN TAKE 1 TABLET TWICE DAILY FOR 2 DAYS AND THEN TAKE 1 TABLET IN THE MORNI      tolterodine (DETROL LA) 2 MG extended release capsule Take 1 capsule by mouth daily 30 capsule 0    aspirin 81 MG tablet Take 81 mg by mouth daily      pravastatin (PRAVACHOL) 10 MG tablet Take 10 mg by mouth daily       FLUoxetine (PROZAC) 40 MG capsule Take 40 mg by mouth daily       benztropine (COGENTIN) 0.5 MG tablet Take 0.5 mg by mouth daily       metFORMIN (GLUCOPHAGE) 500 MG tablet Take 500 mg by mouth daily (with breakfast)         Patient has no known allergies. Social History     Tobacco Use   Smoking Status Former Smoker    Packs/day: 1.00    Years: 30.00    Pack years: 30.00    Start date: 1977   Bello Quit date: 2008    Years since quittin.2   Smokeless Tobacco Never Used       Social History     Substance and Sexual Activity   Alcohol Use No       REVIEW OF SYSTEMS:  Constitutional: negative  Eyes: negative  Respiratory: negative  Cardiovascular: negative  Gastrointestinal: negative  Genitourinary: negative  Musculoskeletal: negative  Skin: negative   Neurological: negative  Hematological/Lymphatic: negative  Psychological: negative    Physical Exam:    This a 61 y.o. female      Vitals:    22 0902   BP: 138/86   Pulse: 86   Resp: 20   SpO2: 95%     Constitutional: Patient in no acute distress   Neuro: alert and oriented to person place and time.     Psych: Mood and affect normal.  Head: atraumatic normocephalic  Eyes: EOMi  HEENT: neck supple, trachea midline  Lungs: Respiratory effort normal  Cardiovascular:  Normal peripheral pulses  Abdomen: Soft, non-tender, non-distended, No CVA  Bladder: non-tender and not distended. FROMx4, no cyanosis clubbing edema  Skin: warm and dry      Assessment and Plan      1. Mixed stress and urge urinary incontinence    2. Obesity, Class III, BMI 40-49.9 (morbid obesity) ([address]           Plan:      No follow-ups on file.   Detrol 4 LA for urge incontinence  RBUS  Behavioral modifications

## 2023-09-13 ENCOUNTER — HOSPITAL ENCOUNTER (OUTPATIENT)
Age: 68
Discharge: HOME | End: 2023-09-13
Payer: MEDICARE

## 2023-09-13 ENCOUNTER — HOSPITAL ENCOUNTER (OUTPATIENT)
Dept: CT IMAGING | Age: 68
Discharge: HOME | End: 2023-09-15
Payer: MEDICARE

## 2023-09-13 ENCOUNTER — OFFICE VISIT (OUTPATIENT)
Dept: PRIMARY CARE CLINIC | Age: 68
DRG: 389 | End: 2023-09-13
Payer: MEDICARE

## 2023-09-13 ENCOUNTER — HOSPITAL ENCOUNTER (INPATIENT)
Age: 68
LOS: 5 days | Discharge: HOME | DRG: 389 | End: 2023-09-18
Attending: INTERNAL MEDICINE | Admitting: INTERNAL MEDICINE
Payer: MEDICARE

## 2023-09-13 VITALS
HEIGHT: 64 IN | WEIGHT: 222.38 LBS | RESPIRATION RATE: 20 BRPM | TEMPERATURE: 98.5 F | OXYGEN SATURATION: 98 % | HEART RATE: 103 BPM | BODY MASS INDEX: 37.97 KG/M2 | DIASTOLIC BLOOD PRESSURE: 86 MMHG | SYSTOLIC BLOOD PRESSURE: 134 MMHG

## 2023-09-13 DIAGNOSIS — K56.609 SBO (SMALL BOWEL OBSTRUCTION) (HCC): Primary | ICD-10-CM

## 2023-09-13 DIAGNOSIS — R63.4 UNINTENTIONAL WEIGHT LOSS: ICD-10-CM

## 2023-09-13 DIAGNOSIS — R10.9 LEFT SIDED ABDOMINAL PAIN: ICD-10-CM

## 2023-09-13 DIAGNOSIS — E87.1 HYPONATREMIA: ICD-10-CM

## 2023-09-13 DIAGNOSIS — N39.0 URINARY TRACT INFECTION WITHOUT HEMATURIA, SITE UNSPECIFIED: ICD-10-CM

## 2023-09-13 DIAGNOSIS — F25.0 SCHIZOAFFECTIVE DISORDER, BIPOLAR TYPE (HCC): ICD-10-CM

## 2023-09-13 PROBLEM — E83.52 HYPERCALCEMIA: Status: RESOLVED | Noted: 2023-05-09 | Resolved: 2023-09-13

## 2023-09-13 PROBLEM — N17.9 AKI (ACUTE KIDNEY INJURY) (HCC): Status: RESOLVED | Noted: 2018-10-06 | Resolved: 2023-09-13

## 2023-09-13 PROBLEM — I50.32 CHRONIC DIASTOLIC CONGESTIVE HEART FAILURE (HCC): Status: ACTIVE | Noted: 2018-01-01

## 2023-09-13 PROBLEM — F20.9 SCHIZOPHRENIA (HCC): Status: RESOLVED | Noted: 2023-09-13 | Resolved: 2023-09-13

## 2023-09-13 PROBLEM — F20.9 SCHIZOPHRENIA (HCC): Status: ACTIVE | Noted: 2023-09-13

## 2023-09-13 PROBLEM — E55.9 VITAMIN D DEFICIENCY: Status: ACTIVE | Noted: 2023-01-25

## 2023-09-13 PROBLEM — R11.2 NAUSEA AND VOMITING: Status: ACTIVE | Noted: 2023-09-13

## 2023-09-13 PROBLEM — E83.52 HYPERCALCEMIA: Status: ACTIVE | Noted: 2023-05-09

## 2023-09-13 LAB
ALBUMIN SERPL-MCNC: 4.4 G/DL (ref 3.5–5.2)
ALBUMIN/GLOB SERPL: 1.5 {RATIO} (ref 1–2.5)
ALP SERPL-CCNC: 72 U/L (ref 35–104)
ALT SERPL-CCNC: 13 U/L (ref 5–33)
ANION GAP SERPL CALCULATED.3IONS-SCNC: 10 MMOL/L (ref 9–17)
AST SERPL-CCNC: 17 U/L
BACTERIA URNS QL MICRO: ABNORMAL
BASOPHILS # BLD: 0.06 K/UL (ref 0–0.2)
BASOPHILS NFR BLD: 0 % (ref 0–2)
BILIRUB SERPL-MCNC: 0.7 MG/DL (ref 0.3–1.2)
BILIRUB UR QL STRIP: NEGATIVE
BUN SERPL-MCNC: 7 MG/DL (ref 8–23)
BUN/CREAT SERPL: 10 (ref 9–20)
CALCIUM SERPL-MCNC: 9.6 MG/DL (ref 8.6–10.4)
CHARACTER UR: ABNORMAL
CHLORIDE SERPL-SCNC: 82 MMOL/L (ref 98–107)
CLARITY UR: CLEAR
CO2 SERPL-SCNC: 30 MMOL/L (ref 20–31)
COLOR UR: YELLOW
CREAT SERPL-MCNC: 0.7 MG/DL (ref 0.5–0.9)
EOSINOPHIL # BLD: 0.07 K/UL (ref 0–0.44)
EOSINOPHILS RELATIVE PERCENT: 0 % (ref 1–4)
EPI CELLS #/AREA URNS HPF: ABNORMAL /HPF (ref 0–5)
ERYTHROCYTE [DISTWIDTH] IN BLOOD BY AUTOMATED COUNT: 13.1 % (ref 11.8–14.4)
GFR SERPL CREATININE-BSD FRML MDRD: >60 ML/MIN/1.73M2
GLUCOSE BLD-MCNC: 118 MG/DL (ref 65–105)
GLUCOSE SERPL-MCNC: 139 MG/DL (ref 70–99)
GLUCOSE UR STRIP-MCNC: NEGATIVE MG/DL
HCT VFR BLD AUTO: 41 % (ref 36.3–47.1)
HGB BLD-MCNC: 13.9 G/DL (ref 11.9–15.1)
HGB UR QL STRIP.AUTO: ABNORMAL
IMM GRANULOCYTES # BLD AUTO: 0.13 K/UL (ref 0–0.3)
IMM GRANULOCYTES NFR BLD: 1 %
KETONES UR STRIP-MCNC: NEGATIVE MG/DL
LACTATE BLDV-SCNC: 1.1 MMOL/L (ref 0.5–2.2)
LEUKOCYTE ESTERASE UR QL STRIP: ABNORMAL
LYMPHOCYTES NFR BLD: 2.12 K/UL (ref 1.1–3.7)
LYMPHOCYTES RELATIVE PERCENT: 14 % (ref 24–43)
MAGNESIUM SERPL-MCNC: 1.1 MG/DL (ref 1.6–2.6)
MCH RBC QN AUTO: 29.1 PG (ref 25.2–33.5)
MCHC RBC AUTO-ENTMCNC: 33.9 G/DL (ref 25.2–33.5)
MCV RBC AUTO: 85.8 FL (ref 82.6–102.9)
MONOCYTES NFR BLD: 1.33 K/UL (ref 0.1–1.2)
MONOCYTES NFR BLD: 9 % (ref 3–12)
NEUTROPHILS NFR BLD: 76 % (ref 36–65)
NEUTS SEG NFR BLD: 11.95 K/UL (ref 1.5–8.1)
NITRITE UR QL STRIP: POSITIVE
NRBC BLD-RTO: 0 PER 100 WBC
PH UR STRIP: 6 [PH] (ref 5–6)
PLATELET # BLD AUTO: 280 K/UL (ref 138–453)
PMV BLD AUTO: 10 FL (ref 8.1–13.5)
POTASSIUM SERPL-SCNC: 3.6 MMOL/L (ref 3.7–5.3)
PROT SERPL-MCNC: 7.4 G/DL (ref 6.4–8.3)
PROT UR STRIP-MCNC: ABNORMAL MG/DL
RBC # BLD AUTO: 4.78 M/UL (ref 3.95–5.11)
RBC #/AREA URNS HPF: ABNORMAL /HPF (ref 0–4)
SODIUM SERPL-SCNC: 122 MMOL/L (ref 135–144)
SP GR UR STRIP: 1 (ref 1.01–1.02)
UROBILINOGEN UR STRIP-ACNC: NORMAL EU/DL (ref 0–1)
WBC #/AREA URNS HPF: ABNORMAL /HPF (ref 0–4)
WBC OTHER # BLD: 15.7 K/UL (ref 3.5–11.3)

## 2023-09-13 PROCEDURE — 87040 BLOOD CULTURE FOR BACTERIA: CPT

## 2023-09-13 PROCEDURE — G8427 DOCREV CUR MEDS BY ELIG CLIN: HCPCS | Performed by: FAMILY MEDICINE

## 2023-09-13 PROCEDURE — 85025 COMPLETE CBC W/AUTO DIFF WBC: CPT

## 2023-09-13 PROCEDURE — 84145 PROCALCITONIN (PCT): CPT

## 2023-09-13 PROCEDURE — G8400 PT W/DXA NO RESULTS DOC: HCPCS | Performed by: FAMILY MEDICINE

## 2023-09-13 PROCEDURE — 2580000003 HC RX 258: Performed by: NURSE PRACTITIONER

## 2023-09-13 PROCEDURE — 3080F DIAST BP >= 90 MM HG: CPT | Performed by: FAMILY MEDICINE

## 2023-09-13 PROCEDURE — 81001 URINALYSIS AUTO W/SCOPE: CPT

## 2023-09-13 PROCEDURE — 1090F PRES/ABSN URINE INCON ASSESS: CPT | Performed by: FAMILY MEDICINE

## 2023-09-13 PROCEDURE — 87077 CULTURE AEROBIC IDENTIFY: CPT

## 2023-09-13 PROCEDURE — G8417 CALC BMI ABV UP PARAM F/U: HCPCS | Performed by: FAMILY MEDICINE

## 2023-09-13 PROCEDURE — 74176 CT ABD & PELVIS W/O CONTRAST: CPT

## 2023-09-13 PROCEDURE — 3017F COLORECTAL CA SCREEN DOC REV: CPT | Performed by: FAMILY MEDICINE

## 2023-09-13 PROCEDURE — 36415 COLL VENOUS BLD VENIPUNCTURE: CPT

## 2023-09-13 PROCEDURE — 80053 COMPREHEN METABOLIC PANEL: CPT

## 2023-09-13 PROCEDURE — 82947 ASSAY GLUCOSE BLOOD QUANT: CPT

## 2023-09-13 PROCEDURE — 3077F SYST BP >= 140 MM HG: CPT | Performed by: FAMILY MEDICINE

## 2023-09-13 PROCEDURE — 6360000002 HC RX W HCPCS: Performed by: NURSE PRACTITIONER

## 2023-09-13 PROCEDURE — 83605 ASSAY OF LACTIC ACID: CPT

## 2023-09-13 PROCEDURE — 99222 1ST HOSP IP/OBS MODERATE 55: CPT | Performed by: NURSE PRACTITIONER

## 2023-09-13 PROCEDURE — 2060000000 HC ICU INTERMEDIATE R&B

## 2023-09-13 PROCEDURE — 1123F ACP DISCUSS/DSCN MKR DOCD: CPT | Performed by: FAMILY MEDICINE

## 2023-09-13 PROCEDURE — 1036F TOBACCO NON-USER: CPT | Performed by: FAMILY MEDICINE

## 2023-09-13 PROCEDURE — 87186 SC STD MICRODIL/AGAR DIL: CPT

## 2023-09-13 PROCEDURE — 87086 URINE CULTURE/COLONY COUNT: CPT

## 2023-09-13 PROCEDURE — 99215 OFFICE O/P EST HI 40 MIN: CPT | Performed by: FAMILY MEDICINE

## 2023-09-13 PROCEDURE — 99212 OFFICE O/P EST SF 10 MIN: CPT | Performed by: FAMILY MEDICINE

## 2023-09-13 PROCEDURE — 83735 ASSAY OF MAGNESIUM: CPT

## 2023-09-13 RX ORDER — METFORMIN HYDROCHLORIDE 750 MG/1
1500 TABLET, EXTENDED RELEASE ORAL DAILY
COMMUNITY
Start: 2023-09-05

## 2023-09-13 RX ORDER — CINACALCET 30 MG/1
30 TABLET, FILM COATED ORAL DAILY
COMMUNITY
Start: 2023-09-05

## 2023-09-13 RX ORDER — OXYBUTYNIN CHLORIDE 5 MG/1
5 TABLET ORAL DAILY
Status: ON HOLD | COMMUNITY
Start: 2023-07-06 | End: 2023-09-18 | Stop reason: HOSPADM

## 2023-09-13 RX ORDER — SODIUM CHLORIDE 9 MG/ML
INJECTION, SOLUTION INTRAVENOUS PRN
Status: DISCONTINUED | OUTPATIENT
Start: 2023-09-13 | End: 2023-09-18 | Stop reason: HOSPADM

## 2023-09-13 RX ORDER — INSULIN LISPRO 100 [IU]/ML
0-8 INJECTION, SOLUTION INTRAVENOUS; SUBCUTANEOUS EVERY 6 HOURS SCHEDULED
Status: DISCONTINUED | OUTPATIENT
Start: 2023-09-14 | End: 2023-09-18 | Stop reason: HOSPADM

## 2023-09-13 RX ORDER — SODIUM CHLORIDE 0.9 % (FLUSH) 0.9 %
5-40 SYRINGE (ML) INJECTION PRN
Status: DISCONTINUED | OUTPATIENT
Start: 2023-09-13 | End: 2023-09-18 | Stop reason: HOSPADM

## 2023-09-13 RX ORDER — ACETAMINOPHEN 650 MG/1
650 SUPPOSITORY RECTAL EVERY 6 HOURS PRN
Status: DISCONTINUED | OUTPATIENT
Start: 2023-09-13 | End: 2023-09-18 | Stop reason: HOSPADM

## 2023-09-13 RX ORDER — KETOROLAC TROMETHAMINE 30 MG/ML
15 INJECTION, SOLUTION INTRAMUSCULAR; INTRAVENOUS EVERY 6 HOURS PRN
Status: DISCONTINUED | OUTPATIENT
Start: 2023-09-13 | End: 2023-09-18 | Stop reason: HOSPADM

## 2023-09-13 RX ORDER — SODIUM CHLORIDE, SODIUM LACTATE, POTASSIUM CHLORIDE, AND CALCIUM CHLORIDE .6; .31; .03; .02 G/100ML; G/100ML; G/100ML; G/100ML
1000 INJECTION, SOLUTION INTRAVENOUS ONCE
Status: DISCONTINUED | OUTPATIENT
Start: 2023-09-13 | End: 2023-09-13

## 2023-09-13 RX ORDER — SODIUM CHLORIDE AND POTASSIUM CHLORIDE 150; 900 MG/100ML; MG/100ML
INJECTION, SOLUTION INTRAVENOUS CONTINUOUS
Status: DISCONTINUED | OUTPATIENT
Start: 2023-09-13 | End: 2023-09-15

## 2023-09-13 RX ORDER — MAGNESIUM SULFATE 1 G/100ML
1000 INJECTION INTRAVENOUS
Status: COMPLETED | OUTPATIENT
Start: 2023-09-13 | End: 2023-09-14

## 2023-09-13 RX ORDER — SODIUM CHLORIDE 0.9 % (FLUSH) 0.9 %
5-40 SYRINGE (ML) INJECTION EVERY 12 HOURS SCHEDULED
Status: DISCONTINUED | OUTPATIENT
Start: 2023-09-13 | End: 2023-09-18 | Stop reason: HOSPADM

## 2023-09-13 RX ORDER — INSULIN GLARGINE 100 [IU]/ML
5 INJECTION, SOLUTION SUBCUTANEOUS DAILY
Status: DISCONTINUED | OUTPATIENT
Start: 2023-09-14 | End: 2023-09-18 | Stop reason: HOSPADM

## 2023-09-13 RX ORDER — ACETAMINOPHEN 160 MG
TABLET,DISINTEGRATING ORAL
COMMUNITY

## 2023-09-13 RX ORDER — LORAZEPAM 1 MG/1
1 TABLET ORAL DAILY PRN
COMMUNITY

## 2023-09-13 RX ORDER — POTASSIUM CHLORIDE 7.45 MG/ML
10 INJECTION INTRAVENOUS ONCE
Status: COMPLETED | OUTPATIENT
Start: 2023-09-13 | End: 2023-09-14

## 2023-09-13 RX ORDER — POLYETHYLENE GLYCOL 3350 17 G/17G
17 POWDER, FOR SOLUTION ORAL DAILY PRN
Status: DISCONTINUED | OUTPATIENT
Start: 2023-09-13 | End: 2023-09-18 | Stop reason: HOSPADM

## 2023-09-13 RX ORDER — ENALAPRILAT 1.25 MG/ML
1.25 INJECTION INTRAVENOUS EVERY 6 HOURS PRN
Status: DISCONTINUED | OUTPATIENT
Start: 2023-09-13 | End: 2023-09-18 | Stop reason: HOSPADM

## 2023-09-13 RX ORDER — FLUOXETINE HYDROCHLORIDE 20 MG/1
20 CAPSULE ORAL DAILY
COMMUNITY
Start: 2023-09-05

## 2023-09-13 RX ORDER — ONDANSETRON 2 MG/ML
4 INJECTION INTRAMUSCULAR; INTRAVENOUS EVERY 6 HOURS PRN
Status: DISCONTINUED | OUTPATIENT
Start: 2023-09-13 | End: 2023-09-18 | Stop reason: HOSPADM

## 2023-09-13 RX ORDER — ALENDRONATE SODIUM 70 MG/1
70 TABLET ORAL
COMMUNITY

## 2023-09-13 RX ORDER — ACETAMINOPHEN 325 MG/1
650 TABLET ORAL EVERY 6 HOURS PRN
Status: DISCONTINUED | OUTPATIENT
Start: 2023-09-13 | End: 2023-09-18 | Stop reason: HOSPADM

## 2023-09-13 RX ORDER — SODIUM CHLORIDE, SODIUM LACTATE, POTASSIUM CHLORIDE, AND CALCIUM CHLORIDE .6; .31; .03; .02 G/100ML; G/100ML; G/100ML; G/100ML
500 INJECTION, SOLUTION INTRAVENOUS ONCE
Status: DISCONTINUED | OUTPATIENT
Start: 2023-09-13 | End: 2023-09-13

## 2023-09-13 RX ORDER — ARIPIPRAZOLE 10 MG/1
10 TABLET ORAL DAILY
COMMUNITY
Start: 2023-09-05

## 2023-09-13 RX ORDER — ONDANSETRON 4 MG/1
4 TABLET, ORALLY DISINTEGRATING ORAL EVERY 8 HOURS PRN
Status: DISCONTINUED | OUTPATIENT
Start: 2023-09-13 | End: 2023-09-18 | Stop reason: HOSPADM

## 2023-09-13 RX ORDER — LORAZEPAM 2 MG/ML
1 INJECTION INTRAMUSCULAR EVERY 4 HOURS PRN
Status: DISCONTINUED | OUTPATIENT
Start: 2023-09-13 | End: 2023-09-18 | Stop reason: HOSPADM

## 2023-09-13 RX ORDER — DEXTROSE MONOHYDRATE 100 MG/ML
INJECTION, SOLUTION INTRAVENOUS CONTINUOUS PRN
Status: DISCONTINUED | OUTPATIENT
Start: 2023-09-13 | End: 2023-09-18 | Stop reason: HOSPADM

## 2023-09-13 RX ORDER — ENOXAPARIN SODIUM 100 MG/ML
40 INJECTION SUBCUTANEOUS DAILY
Status: DISCONTINUED | OUTPATIENT
Start: 2023-09-14 | End: 2023-09-14

## 2023-09-13 RX ADMIN — SODIUM CHLORIDE, POTASSIUM CHLORIDE, SODIUM LACTATE AND CALCIUM CHLORIDE 500 ML: 600; 310; 30; 20 INJECTION, SOLUTION INTRAVENOUS at 22:54

## 2023-09-13 RX ADMIN — MAGNESIUM SULFATE HEPTAHYDRATE 1000 MG: 1 INJECTION, SOLUTION INTRAVENOUS at 23:22

## 2023-09-13 RX ADMIN — CEFTRIAXONE 1000 MG: 1 INJECTION, POWDER, FOR SOLUTION INTRAMUSCULAR; INTRAVENOUS at 22:58

## 2023-09-13 RX ADMIN — POTASSIUM CHLORIDE 10 MEQ: 7.46 INJECTION, SOLUTION INTRAVENOUS at 23:36

## 2023-09-13 RX ADMIN — SODIUM CHLORIDE, PRESERVATIVE FREE 10 ML: 5 INJECTION INTRAVENOUS at 23:00

## 2023-09-13 RX ADMIN — POTASSIUM CHLORIDE AND SODIUM CHLORIDE: 900; 150 INJECTION, SOLUTION INTRAVENOUS at 22:55

## 2023-09-13 ASSESSMENT — ENCOUNTER SYMPTOMS
NAUSEA: 1
COUGH: 0
ABDOMINAL PAIN: 1
VOMITING: 1
CHANGE IN BOWEL HABIT: 1

## 2023-09-14 ENCOUNTER — APPOINTMENT (OUTPATIENT)
Dept: GENERAL RADIOLOGY | Age: 68
DRG: 389 | End: 2023-09-14
Attending: INTERNAL MEDICINE
Payer: MEDICARE

## 2023-09-14 PROBLEM — E83.42 HYPOMAGNESEMIA: Status: ACTIVE | Noted: 2023-09-13

## 2023-09-14 LAB
ANION GAP SERPL CALCULATED.3IONS-SCNC: 11 MMOL/L (ref 9–17)
BASOPHILS # BLD: 0.06 K/UL (ref 0–0.2)
BASOPHILS NFR BLD: 1 % (ref 0–2)
BUN SERPL-MCNC: 7 MG/DL (ref 8–23)
BUN/CREAT SERPL: 10 (ref 9–20)
CALCIUM SERPL-MCNC: 9.2 MG/DL (ref 8.6–10.4)
CHLORIDE SERPL-SCNC: 82 MMOL/L (ref 98–107)
CO2 SERPL-SCNC: 28 MMOL/L (ref 20–31)
CREAT SERPL-MCNC: 0.7 MG/DL (ref 0.5–0.9)
EOSINOPHIL # BLD: 0.15 K/UL (ref 0–0.44)
EOSINOPHILS RELATIVE PERCENT: 1 % (ref 1–4)
ERYTHROCYTE [DISTWIDTH] IN BLOOD BY AUTOMATED COUNT: 13.1 % (ref 11.8–14.4)
GFR SERPL CREATININE-BSD FRML MDRD: >60 ML/MIN/1.73M2
GLUCOSE BLD-MCNC: 130 MG/DL (ref 65–105)
GLUCOSE BLD-MCNC: 138 MG/DL (ref 65–105)
GLUCOSE SERPL-MCNC: 124 MG/DL (ref 70–99)
HCT VFR BLD AUTO: 39.2 % (ref 36.3–47.1)
HGB BLD-MCNC: 13.4 G/DL (ref 11.9–15.1)
IMM GRANULOCYTES # BLD AUTO: 0.14 K/UL (ref 0–0.3)
IMM GRANULOCYTES NFR BLD: 1 %
LYMPHOCYTES NFR BLD: 2.2 K/UL (ref 1.1–3.7)
LYMPHOCYTES RELATIVE PERCENT: 17 % (ref 24–43)
MAGNESIUM SERPL-MCNC: 2 MG/DL (ref 1.6–2.6)
MCH RBC QN AUTO: 29.5 PG (ref 25.2–33.5)
MCHC RBC AUTO-ENTMCNC: 34.2 G/DL (ref 25.2–33.5)
MCV RBC AUTO: 86.3 FL (ref 82.6–102.9)
MONOCYTES NFR BLD: 1.23 K/UL (ref 0.1–1.2)
MONOCYTES NFR BLD: 9 % (ref 3–12)
NEUTROPHILS NFR BLD: 71 % (ref 36–65)
NEUTS SEG NFR BLD: 9.52 K/UL (ref 1.5–8.1)
NRBC BLD-RTO: 0 PER 100 WBC
PLATELET # BLD AUTO: 256 K/UL (ref 138–453)
PMV BLD AUTO: 10.5 FL (ref 8.1–13.5)
POTASSIUM SERPL-SCNC: 3.9 MMOL/L (ref 3.7–5.3)
RBC # BLD AUTO: 4.54 M/UL (ref 3.95–5.11)
SODIUM SERPL-SCNC: 121 MMOL/L (ref 135–144)
WBC OTHER # BLD: 13.3 K/UL (ref 3.5–11.3)

## 2023-09-14 PROCEDURE — 2060000000 HC ICU INTERMEDIATE R&B

## 2023-09-14 PROCEDURE — 80048 BASIC METABOLIC PNL TOTAL CA: CPT

## 2023-09-14 PROCEDURE — 36415 COLL VENOUS BLD VENIPUNCTURE: CPT

## 2023-09-14 PROCEDURE — 99231 SBSQ HOSP IP/OBS SF/LOW 25: CPT | Performed by: INTERNAL MEDICINE

## 2023-09-14 PROCEDURE — 2500000003 HC RX 250 WO HCPCS: Performed by: NURSE PRACTITIONER

## 2023-09-14 PROCEDURE — 82947 ASSAY GLUCOSE BLOOD QUANT: CPT

## 2023-09-14 PROCEDURE — 94760 N-INVAS EAR/PLS OXIMETRY 1: CPT

## 2023-09-14 PROCEDURE — 6360000002 HC RX W HCPCS: Performed by: INTERNAL MEDICINE

## 2023-09-14 PROCEDURE — 6370000000 HC RX 637 (ALT 250 FOR IP): Performed by: NURSE PRACTITIONER

## 2023-09-14 PROCEDURE — 85025 COMPLETE CBC W/AUTO DIFF WBC: CPT

## 2023-09-14 PROCEDURE — 99221 1ST HOSP IP/OBS SF/LOW 40: CPT | Performed by: SURGERY

## 2023-09-14 PROCEDURE — 83735 ASSAY OF MAGNESIUM: CPT

## 2023-09-14 PROCEDURE — 6360000002 HC RX W HCPCS: Performed by: NURSE PRACTITIONER

## 2023-09-14 PROCEDURE — 2580000003 HC RX 258: Performed by: NURSE PRACTITIONER

## 2023-09-14 PROCEDURE — 74022 RADEX COMPL AQT ABD SERIES: CPT

## 2023-09-14 RX ORDER — ENOXAPARIN SODIUM 100 MG/ML
30 INJECTION SUBCUTANEOUS 2 TIMES DAILY
Status: DISCONTINUED | OUTPATIENT
Start: 2023-09-14 | End: 2023-09-18 | Stop reason: HOSPADM

## 2023-09-14 RX ADMIN — MAGNESIUM SULFATE HEPTAHYDRATE 1000 MG: 1 INJECTION, SOLUTION INTRAVENOUS at 00:31

## 2023-09-14 RX ADMIN — ENOXAPARIN SODIUM 40 MG: 100 INJECTION SUBCUTANEOUS at 09:47

## 2023-09-14 RX ADMIN — MICONAZOLE NITRATE: 20 POWDER TOPICAL at 09:48

## 2023-09-14 RX ADMIN — MAGNESIUM SULFATE HEPTAHYDRATE 1000 MG: 1 INJECTION, SOLUTION INTRAVENOUS at 01:33

## 2023-09-14 RX ADMIN — POTASSIUM CHLORIDE AND SODIUM CHLORIDE: 900; 150 INJECTION, SOLUTION INTRAVENOUS at 09:49

## 2023-09-14 RX ADMIN — CEFTRIAXONE 1000 MG: 1 INJECTION, POWDER, FOR SOLUTION INTRAMUSCULAR; INTRAVENOUS at 20:44

## 2023-09-14 RX ADMIN — ENOXAPARIN SODIUM 30 MG: 100 INJECTION SUBCUTANEOUS at 20:49

## 2023-09-14 RX ADMIN — INSULIN GLARGINE 5 UNITS: 100 INJECTION, SOLUTION SUBCUTANEOUS at 09:47

## 2023-09-14 RX ADMIN — MICONAZOLE NITRATE: 20 POWDER TOPICAL at 01:30

## 2023-09-14 RX ADMIN — ONDANSETRON 4 MG: 2 INJECTION INTRAMUSCULAR; INTRAVENOUS at 09:55

## 2023-09-14 RX ADMIN — MICONAZOLE NITRATE: 20 POWDER TOPICAL at 20:51

## 2023-09-14 RX ADMIN — SODIUM CHLORIDE, PRESERVATIVE FREE 10 ML: 5 INJECTION INTRAVENOUS at 09:55

## 2023-09-14 RX ADMIN — POTASSIUM CHLORIDE AND SODIUM CHLORIDE: 900; 150 INJECTION, SOLUTION INTRAVENOUS at 20:41

## 2023-09-14 ASSESSMENT — ENCOUNTER SYMPTOMS
BLOOD IN STOOL: 0
CHEST TIGHTNESS: 0
ABDOMINAL PAIN: 1
SHORTNESS OF BREATH: 0
DIARRHEA: 0
NAUSEA: 1
CHOKING: 0
ABDOMINAL DISTENTION: 0
CONSTIPATION: 1
VOMITING: 1

## 2023-09-14 NOTE — CARE COORDINATION
Case Management Assessment  Initial Evaluation    Date/Time of Evaluation: 9/14/2023 9:47 AM  Assessment Completed by: Andrey Ayala RN    If patient is discharged prior to next notation, then this note serves as note for discharge by case management. Patient Name: Evette Kumar                   YOB: 1955  Diagnosis: Bowel obstruction ([address]) [A06.982]  SBO (small bowel obstruction) ([address]) [B38.976]                   Date / Time: 9/13/2023  8:56 PM    Patient Admission Status: Inpatient   Readmission Risk (Low < 19, Mod (19-27), High > 27): Readmission Risk Score: 9.5    Current PCP: Dayron Saucedo MD  PCP verified by ? Yes    Chart Reviewed: Yes      History Provided by: Patient  Patient Orientation: Alert and Oriented    Patient Cognition: Alert    Hospitalization in the last 30 days (Readmission):  No    If yes, Readmission Assessment in CM Navigator will be completed. Advance Directives:      Code Status: Full Code   Patient's Primary Decision Maker is: Legal Next of Kin      Discharge Planning:    Patient lives with: Family Members Type of Home: House  Primary Care Giver: Family  Patient Support Systems include: Family Members   Current Financial resources: Medicare  Current community resources: None  Current services prior to admission: None            Current DME:              Type of Home Care services:  None    ADLS  Prior functional level: Independent in ADLs/IADLs  Current functional level: Independent in ADLs/IADLs    PT AM-PAC:   /24  OT AM-PAC:   /24    Family can provide assistance at DC: Yes  Would you like Case Management to discuss the discharge plan with any other family members/significant others, and if so, who?  No  Plans to Return to Present Housing: Yes  Other Identified Issues/Barriers to RETURNING to current housing: yes  Potential Assistance needed at discharge: N/A            Potential DME:    Patient expects to discharge to: [address] [place] [address] for transportation at

## 2023-09-14 NOTE — CARE COORDINATION
DISCHARGE BARRIERS       Reason for Referral:  SW completed a Psychosocial Assessment for evaluation of patient's mental health, social status, and functional capacity within the community. Arvin Siemens is a 74 y.o. female admitted due to SBO (small bowel obstruction) ([address] Patient alone. SW provided supportive listening while patient discussed past medical history and events leading up to hospitalization. Mental Status:  Alert, oriented, and engaging during assessment. Decision Making:  Makes own decisions. Family/Social/Home Environment: patient states she lives alone at first. Then reports her daughter, granddaughter, and children are with her often. Support: Discussed a good social support network     Current Services:  Meals On Wheels     Current DMEs: shower chair      PCP: Randell Cantu MD and repots no issues affording medication.  status:   None     ADLs and means of transportation: Independent in ADLs prior to hospitalization and unable to transport self. Food insecurity or needed financial assistance: Denies any food insecurity or financial concerns at this time. ACP and Code Status:  SW discussed an Advance Directive which included the patient's choices for care and treatment in the case of a health event that adversely affects decision-making abilities. SW provided education and resources. Arvin Siemens has no questions at this time and has agreed to keep me up-to-date should anything change. Arvin Siemens is a Full Code status and has NO advanced directive - not interested in additional information. Collaborative List of SNF/ECF/HH were provided: offered, declined No discharge order at this time. Anticipated Needs/Discharge Plan:  Spoke with patient/family/representative about discharge plan. Patient/Family/Representative verbalizes understanding of the plan of care and denies discharge needs or further services at this time. SW provided business card.  Cameron Schultz

## 2023-09-14 NOTE — H&P
hematuria -- urine culture pending, IV Rocephin, monitor for urinary retention  6. DMII -- insulins, monitor and titrate prn  7. Hypertension -- stable -- prn vasotec while NPO  8. Schizoaffective disorder -- prn ativan while NPO  9. Yeast dermatitis -- micotin powder  10. Home medications reviewed -- held while NPO, resume essential medications when okay with general surgery  11. DVT prophylaxis -- lovenox  12. See orders     Note that over 55 minutes was spent in reviewing and obtaining history, physical examination and evaluation of the patient, placing orders, providing education, coordinating care, reviewing test results, documenting in the medical record, and discussion/communicating with patient/caregiver/family.     Electronically signed by EMILIE Summers CNP, NP-C, FNP-BC on 9/13/2023 at 9:29 PM  Hospitalist/Nocturnist

## 2023-09-14 NOTE — CONSULTS
Abdominal Pain  This is a recurrent problem. The current episode started yesterday (Onset is unclear. Patient is a poor historian). The problem has been unchanged (again not reliable). The pain is located in the suprapubic region and LLQ. The quality of the pain is aching (vague). Associated symptoms include constipation, dysuria, nausea and vomiting. Pertinent negatives include no diarrhea or fever. Associated symptoms comments: She is not sure whether she has had diarrhea or not. She told me no, but told Dr. Mychal Lomas she did. . Prior diagnostic workup includes CT scan. Her past medical history is significant for abdominal surgery. History of uterine cancer (type?)      She does not know if she had radiation or chemotherapy. Patient frequently states she doesn't know, or does not answer the question asked. Clearly has some limited.      Past Medical History:   Diagnosis Date    Abnormal cardiovascular stress test 03/06/2018    declined cardiac cath or beta blocker    Abnormal echocardiogram 01/18/2018    EF 06-52%, grade 1 Diastolic dysfunction    REBECCA (acute kidney injury) ([address]) 10/6/2018    Anxiety     Depression     Dyspnea on exertion     GERD (gastroesophageal reflux disease)     Hashimoto's disease     Hypercalcemia 05/09/2023    Hyperlipidemia     Iron deficiency anemia     Schizoaffective disorder, bipolar type (HCC)     Schizophrenia ([address])     Type 2 diabetes mellitus without complication ([address])     Uterine cancer ([address])      Past Surgical History:   Procedure Laterality Date    COLONOSCOPY      HYSTERECTOMY, TOTAL ABDOMINAL (CERVIX REMOVED)       Current Facility-Administered Medications   Medication Dose Route Frequency Provider Last Rate Last Admin    miconazole (MICOTIN) 2 % powder   Topical BID María Million, APRN - CNP   Given at 09/14/23 0948    sodium chloride flush 0.9 % injection 5-40 mL  5-40 mL IntraVENous 2 times per day María Million, APRN - CNP   10 mL at 09/14/23 0955    sodium chloride

## 2023-09-14 NOTE — PLAN OF CARE
Problem: Discharge Planning  Goal: Discharge to home or other facility with appropriate resources  9/14/2023 1851 by Tanvi Hernandez RN  Outcome: Progressing  Note: To return home with daughter  9/14/2023 1848 by Tanvi Hernandez RN  Outcome: Progressing     Problem: Safety - Adult  Goal: Free from fall injury  9/14/2023 1851 by Tanvi Hernandez RN  Outcome: Progressing  Note: No safety issues at this time  9/14/2023 1848 by Tanvi Hernandez RN  Outcome: Progressing     Problem: ABCDS Injury Assessment  Goal: Absence of physical injury  9/14/2023 1851 by Tanvi Hernandez RN  Outcome: Progressing  Note: No injury this shift  9/14/2023 1848 by Tanvi Hernandez RN  Outcome: Progressing     Problem: Chronic Conditions and Co-morbidities  Goal: Patient's chronic conditions and co-morbidity symptoms are monitored and maintained or improved  Outcome: Progressing  Flowsheets (Taken 9/14/2023 1848)  Care Plan - Patient's Chronic Conditions and Co-Morbidity Symptoms are Monitored and Maintained or Improved: Monitor and assess patient's chronic conditions and comorbid symptoms for stability, deterioration, or improvement

## 2023-09-14 NOTE — PLAN OF CARE
Problem: Discharge Planning  Goal: Discharge to home or other facility with appropriate resources  Outcome: Progressing  Flowsheets (Taken 9/13/2023 2026)  Discharge to home or other facility with appropriate resources:   Identify barriers to discharge with patient and caregiver   Arrange for needed discharge resources and transportation as appropriate   Identify discharge learning needs (meds, wound care, etc)   Refer to discharge planning if patient needs post-hospital services based on physician order or complex needs related to functional status, cognitive ability or social support system     Problem: Safety - Adult  Goal: Free from fall injury  Outcome: Progressing     Problem: ABCDS Injury Assessment  Goal: Absence of physical injury  Outcome: Progressing

## 2023-09-15 ENCOUNTER — APPOINTMENT (OUTPATIENT)
Dept: GENERAL RADIOLOGY | Age: 68
DRG: 389 | End: 2023-09-15
Attending: INTERNAL MEDICINE
Payer: MEDICARE

## 2023-09-15 ENCOUNTER — APPOINTMENT (OUTPATIENT)
Dept: CT IMAGING | Age: 68
DRG: 389 | End: 2023-09-15
Attending: INTERNAL MEDICINE
Payer: MEDICARE

## 2023-09-15 LAB
ANION GAP SERPL CALCULATED.3IONS-SCNC: 12 MMOL/L (ref 9–17)
BASOPHILS # BLD: 0.06 K/UL (ref 0–0.2)
BASOPHILS NFR BLD: 1 % (ref 0–2)
BUN SERPL-MCNC: 9 MG/DL (ref 8–23)
BUN/CREAT SERPL: 9 (ref 9–20)
CALCIUM SERPL-MCNC: 8.7 MG/DL (ref 8.6–10.4)
CHLORIDE SERPL-SCNC: 97 MMOL/L (ref 98–107)
CO2 SERPL-SCNC: 24 MMOL/L (ref 20–31)
CREAT SERPL-MCNC: 1 MG/DL (ref 0.5–0.9)
EOSINOPHIL # BLD: 0.08 K/UL (ref 0–0.44)
EOSINOPHILS RELATIVE PERCENT: 1 % (ref 1–4)
ERYTHROCYTE [DISTWIDTH] IN BLOOD BY AUTOMATED COUNT: 13.7 % (ref 11.8–14.4)
GFR SERPL CREATININE-BSD FRML MDRD: >60 ML/MIN/1.73M2
GLUCOSE BLD-MCNC: 109 MG/DL (ref 65–105)
GLUCOSE BLD-MCNC: 73 MG/DL (ref 65–105)
GLUCOSE BLD-MCNC: 77 MG/DL (ref 65–105)
GLUCOSE BLD-MCNC: 95 MG/DL (ref 65–105)
GLUCOSE SERPL-MCNC: 88 MG/DL (ref 70–99)
HCT VFR BLD AUTO: 36 % (ref 36.3–47.1)
HGB BLD-MCNC: 12 G/DL (ref 11.9–15.1)
IMM GRANULOCYTES # BLD AUTO: 0.07 K/UL (ref 0–0.3)
IMM GRANULOCYTES NFR BLD: 1 %
LYMPHOCYTES NFR BLD: 1.75 K/UL (ref 1.1–3.7)
LYMPHOCYTES RELATIVE PERCENT: 21 % (ref 24–43)
MAGNESIUM SERPL-MCNC: 1.6 MG/DL (ref 1.6–2.6)
MCH RBC QN AUTO: 29.3 PG (ref 25.2–33.5)
MCHC RBC AUTO-ENTMCNC: 33.3 G/DL (ref 25.2–33.5)
MCV RBC AUTO: 88 FL (ref 82.6–102.9)
MONOCYTES NFR BLD: 0.94 K/UL (ref 0.1–1.2)
MONOCYTES NFR BLD: 11 % (ref 3–12)
NEUTROPHILS NFR BLD: 65 % (ref 36–65)
NEUTS SEG NFR BLD: 5.48 K/UL (ref 1.5–8.1)
NRBC BLD-RTO: 0 PER 100 WBC
PLATELET # BLD AUTO: 217 K/UL (ref 138–453)
PMV BLD AUTO: 10.9 FL (ref 8.1–13.5)
POTASSIUM SERPL-SCNC: 3.8 MMOL/L (ref 3.7–5.3)
PROCALCITONIN SERPL-MCNC: 0.08 NG/ML (ref 0–0.09)
RBC # BLD AUTO: 4.09 M/UL (ref 3.95–5.11)
SODIUM SERPL-SCNC: 133 MMOL/L (ref 135–144)
WBC OTHER # BLD: 8.4 K/UL (ref 3.5–11.3)

## 2023-09-15 PROCEDURE — 83735 ASSAY OF MAGNESIUM: CPT

## 2023-09-15 PROCEDURE — 82947 ASSAY GLUCOSE BLOOD QUANT: CPT

## 2023-09-15 PROCEDURE — 99231 SBSQ HOSP IP/OBS SF/LOW 25: CPT | Performed by: INTERNAL MEDICINE

## 2023-09-15 PROCEDURE — 99231 SBSQ HOSP IP/OBS SF/LOW 25: CPT | Performed by: SURGERY

## 2023-09-15 PROCEDURE — 80048 BASIC METABOLIC PNL TOTAL CA: CPT

## 2023-09-15 PROCEDURE — 2580000003 HC RX 258: Performed by: INTERNAL MEDICINE

## 2023-09-15 PROCEDURE — 74022 RADEX COMPL AQT ABD SERIES: CPT

## 2023-09-15 PROCEDURE — 36415 COLL VENOUS BLD VENIPUNCTURE: CPT

## 2023-09-15 PROCEDURE — 6360000002 HC RX W HCPCS: Performed by: INTERNAL MEDICINE

## 2023-09-15 PROCEDURE — 6360000004 HC RX CONTRAST MEDICATION: Performed by: SURGERY

## 2023-09-15 PROCEDURE — 2060000000 HC ICU INTERMEDIATE R&B

## 2023-09-15 PROCEDURE — 2500000003 HC RX 250 WO HCPCS: Performed by: INTERNAL MEDICINE

## 2023-09-15 PROCEDURE — 2709999900 CT ABDOMEN PELVIS W IV CONTRAST

## 2023-09-15 PROCEDURE — 85025 COMPLETE CBC W/AUTO DIFF WBC: CPT

## 2023-09-15 PROCEDURE — 6360000002 HC RX W HCPCS: Performed by: NURSE PRACTITIONER

## 2023-09-15 PROCEDURE — 6370000000 HC RX 637 (ALT 250 FOR IP): Performed by: NURSE PRACTITIONER

## 2023-09-15 PROCEDURE — 51701 INSERT BLADDER CATHETER: CPT

## 2023-09-15 PROCEDURE — 6370000000 HC RX 637 (ALT 250 FOR IP): Performed by: INTERNAL MEDICINE

## 2023-09-15 PROCEDURE — 51798 US URINE CAPACITY MEASURE: CPT

## 2023-09-15 RX ORDER — BETHANECHOL CHLORIDE 25 MG/1
25 TABLET ORAL 4 TIMES DAILY
Status: DISCONTINUED | OUTPATIENT
Start: 2023-09-15 | End: 2023-09-18 | Stop reason: HOSPADM

## 2023-09-15 RX ORDER — CINACALCET 30 MG/1
30 TABLET, FILM COATED ORAL DAILY
Status: DISCONTINUED | OUTPATIENT
Start: 2023-09-15 | End: 2023-09-15

## 2023-09-15 RX ORDER — LEVOTHYROXINE SODIUM 112 UG/1
112 TABLET ORAL DAILY
Status: DISCONTINUED | OUTPATIENT
Start: 2023-09-15 | End: 2023-09-18 | Stop reason: HOSPADM

## 2023-09-15 RX ORDER — FLUOXETINE HYDROCHLORIDE 20 MG/1
20 CAPSULE ORAL DAILY
Status: DISCONTINUED | OUTPATIENT
Start: 2023-09-15 | End: 2023-09-18 | Stop reason: HOSPADM

## 2023-09-15 RX ORDER — DEXTROSE MONOHYDRATE, SODIUM CHLORIDE, AND POTASSIUM CHLORIDE 50; 1.49; 9 G/1000ML; G/1000ML; G/1000ML
INJECTION, SOLUTION INTRAVENOUS CONTINUOUS
Status: DISCONTINUED | OUTPATIENT
Start: 2023-09-15 | End: 2023-09-18 | Stop reason: HOSPADM

## 2023-09-15 RX ORDER — ARIPIPRAZOLE 5 MG/1
10 TABLET ORAL DAILY
Status: DISCONTINUED | OUTPATIENT
Start: 2023-09-15 | End: 2023-09-18 | Stop reason: HOSPADM

## 2023-09-15 RX ORDER — BENZTROPINE MESYLATE 1 MG/1
1 TABLET ORAL DAILY
Status: DISCONTINUED | OUTPATIENT
Start: 2023-09-15 | End: 2023-09-18 | Stop reason: HOSPADM

## 2023-09-15 RX ADMIN — POTASSIUM CHLORIDE, DEXTROSE MONOHYDRATE AND SODIUM CHLORIDE: 150; 5; 900 INJECTION, SOLUTION INTRAVENOUS at 11:28

## 2023-09-15 RX ADMIN — ENOXAPARIN SODIUM 30 MG: 100 INJECTION SUBCUTANEOUS at 11:26

## 2023-09-15 RX ADMIN — ENOXAPARIN SODIUM 30 MG: 100 INJECTION SUBCUTANEOUS at 21:33

## 2023-09-15 RX ADMIN — CEFTRIAXONE 1000 MG: 1 INJECTION, POWDER, FOR SOLUTION INTRAMUSCULAR; INTRAVENOUS at 21:33

## 2023-09-15 RX ADMIN — MICONAZOLE NITRATE: 20 POWDER TOPICAL at 22:08

## 2023-09-15 RX ADMIN — MICONAZOLE NITRATE: 20 POWDER TOPICAL at 09:00

## 2023-09-15 RX ADMIN — IOHEXOL 50 ML: 300 INJECTION, SOLUTION INTRAVENOUS at 16:16

## 2023-09-15 RX ADMIN — BETHANECHOL CHLORIDE 25 MG: 25 TABLET ORAL at 21:33

## 2023-09-15 RX ADMIN — BENZTROPINE MESYLATE 1 MG: 1 TABLET ORAL at 11:26

## 2023-09-15 RX ADMIN — LEVOTHYROXINE SODIUM 112 MCG: 0.11 TABLET ORAL at 11:25

## 2023-09-15 RX ADMIN — POTASSIUM CHLORIDE AND SODIUM CHLORIDE: 900; 150 INJECTION, SOLUTION INTRAVENOUS at 06:48

## 2023-09-15 RX ADMIN — FLUOXETINE 20 MG: 20 CAPSULE ORAL at 11:25

## 2023-09-15 RX ADMIN — ARIPIPRAZOLE 10 MG: 5 TABLET ORAL at 11:25

## 2023-09-15 RX ADMIN — INSULIN GLARGINE 5 UNITS: 100 INJECTION, SOLUTION SUBCUTANEOUS at 11:25

## 2023-09-15 RX ADMIN — POTASSIUM CHLORIDE, DEXTROSE MONOHYDRATE AND SODIUM CHLORIDE: 150; 5; 900 INJECTION, SOLUTION INTRAVENOUS at 21:31

## 2023-09-15 RX ADMIN — BETHANECHOL CHLORIDE 25 MG: 25 TABLET ORAL at 15:43

## 2023-09-15 RX ADMIN — IOPAMIDOL 100 ML: 755 INJECTION, SOLUTION INTRAVENOUS at 16:16

## 2023-09-15 NOTE — PLAN OF CARE
Problem: Discharge Planning  Goal: Discharge to home or other facility with appropriate resources  Outcome: Progressing  Flowsheets (Taken 9/15/2023 1738)  Discharge to home or other facility with appropriate resources: Identify discharge learning needs (meds, wound care, etc)     Problem: Safety - Adult  Goal: Free from fall injury  Outcome: Progressing  Flowsheets (Taken 9/15/2023 1738)  Free From Fall Injury: Instruct family/caregiver on patient safety     Problem: ABCDS Injury Assessment  Goal: Absence of physical injury  Outcome: Progressing  Flowsheets (Taken 9/15/2023 1738)  Absence of Physical Injury: Implement safety measures based on patient assessment     Problem: Chronic Conditions and Co-morbidities  Goal: Patient's chronic conditions and co-morbidity symptoms are monitored and maintained or improved  Outcome: Progressing  Flowsheets (Taken 9/15/2023 1738)  Care Plan - Patient's Chronic Conditions and Co-Morbidity Symptoms are Monitored and Maintained or Improved: Monitor and assess patient's chronic conditions and comorbid symptoms for stability, deterioration, or improvement

## 2023-09-15 NOTE — FLOWSHEET NOTE
rounding in PCU. Assessment: Patient was accompanied by her daughter's friend Nir and although she seemed confused, she was very talkative about her health situation and desire to go home. Patient lives with her daughter Rony Santiago and two granddaughters, and goes to a psychotherapist to help her cope with the loss of her son and to \"help her with her problems. \"  Patient was encouraged with the reassurance of receiving a new body in heaven and hopes to see her son again. Intervention: Engaged in conversation and active listening. Prayed with Patient. Outcome: Patient expressed appreciation for visit and offer of continued prayer. Plan: Chaplains are available on site or on call 24/7 for spiritual and emotional support.     Electronically signed by Brad Villasenor on 9/15/2023 at 3:39 PM

## 2023-09-15 NOTE — CARE COORDINATION
SW met with patient to coordinate services. Patient states need needs at this time.       Electronically signed by ANGELITA Godwin on 9/15/2023 at 10:44 AM

## 2023-09-15 NOTE — PLAN OF CARE
Problem: Discharge Planning  Goal: Discharge to home or other facility with appropriate resources  9/14/2023 2218 by Fatou Medeiros RN  Outcome: Progressing  9/14/2023 1851 by Jerica Tyson RN  Outcome: Progressing  Note: To return home with daughter  9/14/2023 1848 by Jerica Tyson RN  Outcome: Progressing     Problem: Safety - Adult  Goal: Free from fall injury  9/14/2023 2218 by Fatou Medeiros RN  Outcome: Progressing  9/14/2023 1851 by Jerica Tyson RN  Outcome: Progressing  Note: No safety issues at this time  9/14/2023 1848 by Jerica Tyson RN  Outcome: Progressing     Problem: ABCDS Injury Assessment  Goal: Absence of physical injury  9/14/2023 2218 by Fatou Medeiros RN  Outcome: Progressing  9/14/2023 1851 by Jerica Tyson RN  Outcome: Progressing  Note: No injury this shift  9/14/2023 1848 by Jerica Tyson RN  Outcome: Progressing     Problem: Chronic Conditions and Co-morbidities  Goal: Patient's chronic conditions and co-morbidity symptoms are monitored and maintained or improved  9/14/2023 2218 by Fatou Medeiros RN  Outcome: Progressing  9/14/2023 1848 by Jerica Tyson RN  Outcome: Progressing  Flowsheets (Taken 9/14/2023 1848)  Care Plan - Patient's Chronic Conditions and Co-Morbidity Symptoms are Monitored and Maintained or Improved: Monitor and assess patient's chronic conditions and comorbid symptoms for stability, deterioration, or improvement     Problem: ABCDS Injury Assessment  Goal: Absence of physical injury  9/14/2023 2218 by Fatou Medeiros RN  Outcome: Progressing  9/14/2023 1851 by Jerica Tyson RN  Outcome: Progressing  Note: No injury this shift  9/14/2023 1848 by Jerica Tyson RN  Outcome: Progressing

## 2023-09-16 ENCOUNTER — APPOINTMENT (OUTPATIENT)
Dept: GENERAL RADIOLOGY | Age: 68
DRG: 389 | End: 2023-09-16
Attending: INTERNAL MEDICINE
Payer: MEDICARE

## 2023-09-16 LAB
ANION GAP SERPL CALCULATED.3IONS-SCNC: 9 MMOL/L (ref 9–17)
BASOPHILS # BLD: 0.06 K/UL (ref 0–0.2)
BASOPHILS NFR BLD: 1 % (ref 0–2)
BUN SERPL-MCNC: 5 MG/DL (ref 8–23)
BUN/CREAT SERPL: 7 (ref 9–20)
CALCIUM SERPL-MCNC: 8.6 MG/DL (ref 8.6–10.4)
CHLORIDE SERPL-SCNC: 102 MMOL/L (ref 98–107)
CO2 SERPL-SCNC: 25 MMOL/L (ref 20–31)
CREAT SERPL-MCNC: 0.7 MG/DL (ref 0.5–0.9)
EOSINOPHIL # BLD: 0.21 K/UL (ref 0–0.44)
EOSINOPHILS RELATIVE PERCENT: 3 % (ref 1–4)
ERYTHROCYTE [DISTWIDTH] IN BLOOD BY AUTOMATED COUNT: 14 % (ref 11.8–14.4)
GFR SERPL CREATININE-BSD FRML MDRD: >60 ML/MIN/1.73M2
GLUCOSE BLD-MCNC: 101 MG/DL (ref 65–105)
GLUCOSE BLD-MCNC: 105 MG/DL (ref 65–105)
GLUCOSE BLD-MCNC: 121 MG/DL (ref 65–105)
GLUCOSE BLD-MCNC: 125 MG/DL (ref 65–105)
GLUCOSE SERPL-MCNC: 120 MG/DL (ref 70–99)
HCT VFR BLD AUTO: 34.2 % (ref 36.3–47.1)
HGB BLD-MCNC: 11.3 G/DL (ref 11.9–15.1)
IMM GRANULOCYTES # BLD AUTO: 0.05 K/UL (ref 0–0.3)
IMM GRANULOCYTES NFR BLD: 1 %
LYMPHOCYTES NFR BLD: 1.77 K/UL (ref 1.1–3.7)
LYMPHOCYTES RELATIVE PERCENT: 23 % (ref 24–43)
MAGNESIUM SERPL-MCNC: 1.5 MG/DL (ref 1.6–2.6)
MCH RBC QN AUTO: 29.7 PG (ref 25.2–33.5)
MCHC RBC AUTO-ENTMCNC: 33 G/DL (ref 25.2–33.5)
MCV RBC AUTO: 89.8 FL (ref 82.6–102.9)
MICROORGANISM SPEC CULT: ABNORMAL
MONOCYTES NFR BLD: 0.81 K/UL (ref 0.1–1.2)
MONOCYTES NFR BLD: 10 % (ref 3–12)
NEUTROPHILS NFR BLD: 62 % (ref 36–65)
NEUTS SEG NFR BLD: 4.94 K/UL (ref 1.5–8.1)
NRBC BLD-RTO: 0 PER 100 WBC
PLATELET # BLD AUTO: 218 K/UL (ref 138–453)
PMV BLD AUTO: 10.4 FL (ref 8.1–13.5)
POTASSIUM SERPL-SCNC: 4.2 MMOL/L (ref 3.7–5.3)
RBC # BLD AUTO: 3.81 M/UL (ref 3.95–5.11)
SODIUM SERPL-SCNC: 136 MMOL/L (ref 135–144)
SPECIMEN DESCRIPTION: ABNORMAL
WBC OTHER # BLD: 7.8 K/UL (ref 3.5–11.3)

## 2023-09-16 PROCEDURE — 99232 SBSQ HOSP IP/OBS MODERATE 35: CPT | Performed by: FAMILY MEDICINE

## 2023-09-16 PROCEDURE — 85025 COMPLETE CBC W/AUTO DIFF WBC: CPT

## 2023-09-16 PROCEDURE — 6370000000 HC RX 637 (ALT 250 FOR IP): Performed by: INTERNAL MEDICINE

## 2023-09-16 PROCEDURE — 6360000002 HC RX W HCPCS: Performed by: NURSE PRACTITIONER

## 2023-09-16 PROCEDURE — 36415 COLL VENOUS BLD VENIPUNCTURE: CPT

## 2023-09-16 PROCEDURE — 6360000002 HC RX W HCPCS: Performed by: INTERNAL MEDICINE

## 2023-09-16 PROCEDURE — 83735 ASSAY OF MAGNESIUM: CPT

## 2023-09-16 PROCEDURE — 82947 ASSAY GLUCOSE BLOOD QUANT: CPT

## 2023-09-16 PROCEDURE — 2060000000 HC ICU INTERMEDIATE R&B

## 2023-09-16 PROCEDURE — 2580000003 HC RX 258: Performed by: INTERNAL MEDICINE

## 2023-09-16 PROCEDURE — 2500000003 HC RX 250 WO HCPCS: Performed by: INTERNAL MEDICINE

## 2023-09-16 PROCEDURE — 74019 RADEX ABDOMEN 2 VIEWS: CPT

## 2023-09-16 PROCEDURE — 80048 BASIC METABOLIC PNL TOTAL CA: CPT

## 2023-09-16 PROCEDURE — 6370000000 HC RX 637 (ALT 250 FOR IP): Performed by: NURSE PRACTITIONER

## 2023-09-16 RX ORDER — MAGNESIUM SULFATE 1 G/100ML
1000 INJECTION INTRAVENOUS
Status: COMPLETED | OUTPATIENT
Start: 2023-09-16 | End: 2023-09-16

## 2023-09-16 RX ADMIN — FLUOXETINE 20 MG: 20 CAPSULE ORAL at 09:19

## 2023-09-16 RX ADMIN — BETHANECHOL CHLORIDE 25 MG: 25 TABLET ORAL at 13:15

## 2023-09-16 RX ADMIN — MICONAZOLE NITRATE: 20 POWDER TOPICAL at 20:53

## 2023-09-16 RX ADMIN — POTASSIUM CHLORIDE, DEXTROSE MONOHYDRATE AND SODIUM CHLORIDE: 150; 5; 900 INJECTION, SOLUTION INTRAVENOUS at 16:55

## 2023-09-16 RX ADMIN — CEFTRIAXONE 1000 MG: 1 INJECTION, POWDER, FOR SOLUTION INTRAMUSCULAR; INTRAVENOUS at 20:53

## 2023-09-16 RX ADMIN — INSULIN GLARGINE 5 UNITS: 100 INJECTION, SOLUTION SUBCUTANEOUS at 09:19

## 2023-09-16 RX ADMIN — ENOXAPARIN SODIUM 30 MG: 100 INJECTION SUBCUTANEOUS at 20:50

## 2023-09-16 RX ADMIN — MAGNESIUM SULFATE 1000 MG: 1 INJECTION INTRAVENOUS at 06:45

## 2023-09-16 RX ADMIN — BETHANECHOL CHLORIDE 25 MG: 25 TABLET ORAL at 09:19

## 2023-09-16 RX ADMIN — BETHANECHOL CHLORIDE 25 MG: 25 TABLET ORAL at 16:56

## 2023-09-16 RX ADMIN — ARIPIPRAZOLE 10 MG: 5 TABLET ORAL at 09:19

## 2023-09-16 RX ADMIN — BENZTROPINE MESYLATE 1 MG: 1 TABLET ORAL at 09:19

## 2023-09-16 RX ADMIN — ENOXAPARIN SODIUM 30 MG: 100 INJECTION SUBCUTANEOUS at 09:19

## 2023-09-16 RX ADMIN — POTASSIUM CHLORIDE, DEXTROSE MONOHYDRATE AND SODIUM CHLORIDE: 150; 5; 900 INJECTION, SOLUTION INTRAVENOUS at 05:55

## 2023-09-16 RX ADMIN — MICONAZOLE NITRATE: 20 POWDER TOPICAL at 09:19

## 2023-09-16 RX ADMIN — LORAZEPAM 1 MG: 2 INJECTION INTRAMUSCULAR; INTRAVENOUS at 16:56

## 2023-09-16 RX ADMIN — MAGNESIUM SULFATE 1000 MG: 1 INJECTION INTRAVENOUS at 09:26

## 2023-09-16 RX ADMIN — LEVOTHYROXINE SODIUM 112 MCG: 0.11 TABLET ORAL at 05:56

## 2023-09-16 RX ADMIN — BETHANECHOL CHLORIDE 25 MG: 25 TABLET ORAL at 20:50

## 2023-09-16 NOTE — PLAN OF CARE
Problem: Discharge Planning  Goal: Discharge to home or other facility with appropriate resources  Outcome: Progressing  Flowsheets (Taken 9/16/2023 0916)  Discharge to home or other facility with appropriate resources: Identify barriers to discharge with patient and caregiver     Problem: Safety - Adult  Goal: Free from fall injury  Outcome: Progressing     Problem: ABCDS Injury Assessment  Goal: Absence of physical injury  Outcome: Progressing     Problem: Chronic Conditions and Co-morbidities  Goal: Patient's chronic conditions and co-morbidity symptoms are monitored and maintained or improved  Outcome: Progressing  Flowsheets (Taken 9/16/2023 0916)  Care Plan - Patient's Chronic Conditions and Co-Morbidity Symptoms are Monitored and Maintained or Improved: Monitor and assess patient's chronic conditions and comorbid symptoms for stability, deterioration, or improvement

## 2023-09-17 LAB
ANION GAP SERPL CALCULATED.3IONS-SCNC: 8 MMOL/L (ref 9–17)
BASOPHILS # BLD: 0.04 K/UL (ref 0–0.2)
BASOPHILS NFR BLD: 1 % (ref 0–2)
BUN SERPL-MCNC: 3 MG/DL (ref 8–23)
BUN/CREAT SERPL: 5 (ref 9–20)
CALCIUM SERPL-MCNC: 8.3 MG/DL (ref 8.6–10.4)
CHLORIDE SERPL-SCNC: 105 MMOL/L (ref 98–107)
CO2 SERPL-SCNC: 23 MMOL/L (ref 20–31)
CREAT SERPL-MCNC: 0.6 MG/DL (ref 0.5–0.9)
EOSINOPHIL # BLD: 0.27 K/UL (ref 0–0.44)
EOSINOPHILS RELATIVE PERCENT: 4 % (ref 1–4)
ERYTHROCYTE [DISTWIDTH] IN BLOOD BY AUTOMATED COUNT: 14 % (ref 11.8–14.4)
GFR SERPL CREATININE-BSD FRML MDRD: >60 ML/MIN/1.73M2
GLUCOSE BLD-MCNC: 100 MG/DL (ref 65–105)
GLUCOSE BLD-MCNC: 104 MG/DL (ref 65–105)
GLUCOSE BLD-MCNC: 113 MG/DL (ref 65–105)
GLUCOSE SERPL-MCNC: 115 MG/DL (ref 70–99)
HCT VFR BLD AUTO: 33.6 % (ref 36.3–47.1)
HGB BLD-MCNC: 10.8 G/DL (ref 11.9–15.1)
IMM GRANULOCYTES # BLD AUTO: 0.06 K/UL (ref 0–0.3)
IMM GRANULOCYTES NFR BLD: 1 %
LYMPHOCYTES NFR BLD: 2.14 K/UL (ref 1.1–3.7)
LYMPHOCYTES RELATIVE PERCENT: 28 % (ref 24–43)
MAGNESIUM SERPL-MCNC: 1.4 MG/DL (ref 1.6–2.6)
MCH RBC QN AUTO: 29.3 PG (ref 25.2–33.5)
MCHC RBC AUTO-ENTMCNC: 32.1 G/DL (ref 25.2–33.5)
MCV RBC AUTO: 91.1 FL (ref 82.6–102.9)
MONOCYTES NFR BLD: 0.79 K/UL (ref 0.1–1.2)
MONOCYTES NFR BLD: 10 % (ref 3–12)
NEUTROPHILS NFR BLD: 56 % (ref 36–65)
NEUTS SEG NFR BLD: 4.4 K/UL (ref 1.5–8.1)
NRBC BLD-RTO: 0 PER 100 WBC
PLATELET # BLD AUTO: 210 K/UL (ref 138–453)
PMV BLD AUTO: 10.7 FL (ref 8.1–13.5)
POTASSIUM SERPL-SCNC: 4.2 MMOL/L (ref 3.7–5.3)
RBC # BLD AUTO: 3.69 M/UL (ref 3.95–5.11)
SODIUM SERPL-SCNC: 136 MMOL/L (ref 135–144)
WBC OTHER # BLD: 7.7 K/UL (ref 3.5–11.3)

## 2023-09-17 PROCEDURE — 6370000000 HC RX 637 (ALT 250 FOR IP): Performed by: INTERNAL MEDICINE

## 2023-09-17 PROCEDURE — 2500000003 HC RX 250 WO HCPCS: Performed by: INTERNAL MEDICINE

## 2023-09-17 PROCEDURE — 83735 ASSAY OF MAGNESIUM: CPT

## 2023-09-17 PROCEDURE — 2580000003 HC RX 258: Performed by: INTERNAL MEDICINE

## 2023-09-17 PROCEDURE — 82947 ASSAY GLUCOSE BLOOD QUANT: CPT

## 2023-09-17 PROCEDURE — 6360000002 HC RX W HCPCS: Performed by: NURSE PRACTITIONER

## 2023-09-17 PROCEDURE — 2060000000 HC ICU INTERMEDIATE R&B

## 2023-09-17 PROCEDURE — 80048 BASIC METABOLIC PNL TOTAL CA: CPT

## 2023-09-17 PROCEDURE — 36415 COLL VENOUS BLD VENIPUNCTURE: CPT

## 2023-09-17 PROCEDURE — 94760 N-INVAS EAR/PLS OXIMETRY 1: CPT

## 2023-09-17 PROCEDURE — 6360000002 HC RX W HCPCS: Performed by: INTERNAL MEDICINE

## 2023-09-17 PROCEDURE — 99232 SBSQ HOSP IP/OBS MODERATE 35: CPT | Performed by: SURGERY

## 2023-09-17 PROCEDURE — 2500000003 HC RX 250 WO HCPCS: Performed by: NURSE PRACTITIONER

## 2023-09-17 PROCEDURE — 99232 SBSQ HOSP IP/OBS MODERATE 35: CPT | Performed by: FAMILY MEDICINE

## 2023-09-17 PROCEDURE — 6370000000 HC RX 637 (ALT 250 FOR IP): Performed by: NURSE PRACTITIONER

## 2023-09-17 PROCEDURE — 85025 COMPLETE CBC W/AUTO DIFF WBC: CPT

## 2023-09-17 RX ORDER — MAGNESIUM SULFATE 1 G/100ML
1000 INJECTION INTRAVENOUS
Status: COMPLETED | OUTPATIENT
Start: 2023-09-17 | End: 2023-09-18

## 2023-09-17 RX ADMIN — POTASSIUM CHLORIDE, DEXTROSE MONOHYDRATE AND SODIUM CHLORIDE: 150; 5; 900 INJECTION, SOLUTION INTRAVENOUS at 19:52

## 2023-09-17 RX ADMIN — INSULIN GLARGINE 5 UNITS: 100 INJECTION, SOLUTION SUBCUTANEOUS at 09:47

## 2023-09-17 RX ADMIN — POTASSIUM CHLORIDE, DEXTROSE MONOHYDRATE AND SODIUM CHLORIDE: 150; 5; 900 INJECTION, SOLUTION INTRAVENOUS at 09:58

## 2023-09-17 RX ADMIN — MAGNESIUM SULFATE HEPTAHYDRATE 1000 MG: 1 INJECTION, SOLUTION INTRAVENOUS at 19:55

## 2023-09-17 RX ADMIN — BETHANECHOL CHLORIDE 25 MG: 25 TABLET ORAL at 16:49

## 2023-09-17 RX ADMIN — MICONAZOLE NITRATE: 20 POWDER TOPICAL at 09:48

## 2023-09-17 RX ADMIN — MAGNESIUM SULFATE HEPTAHYDRATE 1000 MG: 1 INJECTION, SOLUTION INTRAVENOUS at 21:59

## 2023-09-17 RX ADMIN — BENZTROPINE MESYLATE 1 MG: 1 TABLET ORAL at 09:48

## 2023-09-17 RX ADMIN — BETHANECHOL CHLORIDE 25 MG: 25 TABLET ORAL at 13:24

## 2023-09-17 RX ADMIN — CEFTRIAXONE 1000 MG: 1 INJECTION, POWDER, FOR SOLUTION INTRAMUSCULAR; INTRAVENOUS at 21:20

## 2023-09-17 RX ADMIN — POTASSIUM CHLORIDE, DEXTROSE MONOHYDRATE AND SODIUM CHLORIDE: 150; 5; 900 INJECTION, SOLUTION INTRAVENOUS at 01:31

## 2023-09-17 RX ADMIN — ENOXAPARIN SODIUM 30 MG: 100 INJECTION SUBCUTANEOUS at 21:18

## 2023-09-17 RX ADMIN — BETHANECHOL CHLORIDE 25 MG: 25 TABLET ORAL at 21:17

## 2023-09-17 RX ADMIN — FLUOXETINE 20 MG: 20 CAPSULE ORAL at 09:48

## 2023-09-17 RX ADMIN — ENOXAPARIN SODIUM 30 MG: 100 INJECTION SUBCUTANEOUS at 09:47

## 2023-09-17 RX ADMIN — MICONAZOLE NITRATE: 20 POWDER TOPICAL at 19:55

## 2023-09-17 RX ADMIN — MAGNESIUM SULFATE HEPTAHYDRATE 1000 MG: 1 INJECTION, SOLUTION INTRAVENOUS at 23:00

## 2023-09-17 RX ADMIN — BETHANECHOL CHLORIDE 25 MG: 25 TABLET ORAL at 09:48

## 2023-09-17 RX ADMIN — LEVOTHYROXINE SODIUM 112 MCG: 0.11 TABLET ORAL at 05:55

## 2023-09-17 RX ADMIN — ARIPIPRAZOLE 10 MG: 5 TABLET ORAL at 09:48

## 2023-09-17 NOTE — PLAN OF CARE
Problem: Discharge Planning  Goal: Discharge to home or other facility with appropriate resources  Outcome: Progressing  Flowsheets (Taken 9/17/2023 0959)  Discharge to home or other facility with appropriate resources: Identify barriers to discharge with patient and caregiver     Problem: Safety - Adult  Goal: Free from fall injury  Outcome: Progressing     Problem: ABCDS Injury Assessment  Goal: Absence of physical injury  Outcome: Progressing     Problem: Chronic Conditions and Co-morbidities  Goal: Patient's chronic conditions and co-morbidity symptoms are monitored and maintained or improved  Outcome: Progressing  Flowsheets (Taken 9/17/2023 0959)  Care Plan - Patient's Chronic Conditions and Co-Morbidity Symptoms are Monitored and Maintained or Improved: Monitor and assess patient's chronic conditions and comorbid symptoms for stability, deterioration, or improvement

## 2023-09-18 VITALS
TEMPERATURE: 98.3 F | HEIGHT: 64 IN | WEIGHT: 224.7 LBS | SYSTOLIC BLOOD PRESSURE: 173 MMHG | HEART RATE: 72 BPM | RESPIRATION RATE: 72 BRPM | DIASTOLIC BLOOD PRESSURE: 92 MMHG | OXYGEN SATURATION: 96 % | BODY MASS INDEX: 38.36 KG/M2

## 2023-09-18 LAB
ANION GAP SERPL CALCULATED.3IONS-SCNC: 8 MMOL/L (ref 9–17)
BASOPHILS # BLD: 0.05 K/UL (ref 0–0.2)
BASOPHILS NFR BLD: 1 % (ref 0–2)
BUN SERPL-MCNC: 2 MG/DL (ref 8–23)
BUN/CREAT SERPL: 3 (ref 9–20)
CALCIUM SERPL-MCNC: 9 MG/DL (ref 8.6–10.4)
CHLORIDE SERPL-SCNC: 102 MMOL/L (ref 98–107)
CO2 SERPL-SCNC: 27 MMOL/L (ref 20–31)
CREAT SERPL-MCNC: 0.6 MG/DL (ref 0.5–0.9)
EOSINOPHIL # BLD: 0.29 K/UL (ref 0–0.44)
EOSINOPHILS RELATIVE PERCENT: 4 % (ref 1–4)
ERYTHROCYTE [DISTWIDTH] IN BLOOD BY AUTOMATED COUNT: 13.7 % (ref 11.8–14.4)
GFR SERPL CREATININE-BSD FRML MDRD: >60 ML/MIN/1.73M2
GLUCOSE BLD-MCNC: 127 MG/DL (ref 65–105)
GLUCOSE BLD-MCNC: 133 MG/DL (ref 65–105)
GLUCOSE BLD-MCNC: 133 MG/DL (ref 65–105)
GLUCOSE BLD-MCNC: 137 MG/DL (ref 65–105)
GLUCOSE SERPL-MCNC: 125 MG/DL (ref 70–99)
HCT VFR BLD AUTO: 36.4 % (ref 36.3–47.1)
HGB BLD-MCNC: 11.8 G/DL (ref 11.9–15.1)
IMM GRANULOCYTES # BLD AUTO: 0.09 K/UL (ref 0–0.3)
IMM GRANULOCYTES NFR BLD: 1 %
LYMPHOCYTES NFR BLD: 2.23 K/UL (ref 1.1–3.7)
LYMPHOCYTES RELATIVE PERCENT: 29 % (ref 24–43)
MAGNESIUM SERPL-MCNC: 1.8 MG/DL (ref 1.6–2.6)
MCH RBC QN AUTO: 29.4 PG (ref 25.2–33.5)
MCHC RBC AUTO-ENTMCNC: 32.4 G/DL (ref 25.2–33.5)
MCV RBC AUTO: 90.5 FL (ref 82.6–102.9)
MICROORGANISM SPEC CULT: NORMAL
MICROORGANISM SPEC CULT: NORMAL
MONOCYTES NFR BLD: 0.76 K/UL (ref 0.1–1.2)
MONOCYTES NFR BLD: 10 % (ref 3–12)
NEUTROPHILS NFR BLD: 55 % (ref 36–65)
NEUTS SEG NFR BLD: 4.17 K/UL (ref 1.5–8.1)
NRBC BLD-RTO: 0 PER 100 WBC
PLATELET # BLD AUTO: 237 K/UL (ref 138–453)
PMV BLD AUTO: 10.5 FL (ref 8.1–13.5)
POTASSIUM SERPL-SCNC: 4.5 MMOL/L (ref 3.7–5.3)
RBC # BLD AUTO: 4.02 M/UL (ref 3.95–5.11)
SERVICE CMNT-IMP: NORMAL
SERVICE CMNT-IMP: NORMAL
SODIUM SERPL-SCNC: 137 MMOL/L (ref 135–144)
SPECIMEN DESCRIPTION: NORMAL
SPECIMEN DESCRIPTION: NORMAL
WBC OTHER # BLD: 7.6 K/UL (ref 3.5–11.3)

## 2023-09-18 PROCEDURE — 6360000002 HC RX W HCPCS: Performed by: INTERNAL MEDICINE

## 2023-09-18 PROCEDURE — 80048 BASIC METABOLIC PNL TOTAL CA: CPT

## 2023-09-18 PROCEDURE — 99231 SBSQ HOSP IP/OBS SF/LOW 25: CPT | Performed by: INTERNAL MEDICINE

## 2023-09-18 PROCEDURE — 2500000003 HC RX 250 WO HCPCS: Performed by: NURSE PRACTITIONER

## 2023-09-18 PROCEDURE — 36415 COLL VENOUS BLD VENIPUNCTURE: CPT

## 2023-09-18 PROCEDURE — 6370000000 HC RX 637 (ALT 250 FOR IP): Performed by: NURSE PRACTITIONER

## 2023-09-18 PROCEDURE — 85025 COMPLETE CBC W/AUTO DIFF WBC: CPT

## 2023-09-18 PROCEDURE — 83735 ASSAY OF MAGNESIUM: CPT

## 2023-09-18 PROCEDURE — 6370000000 HC RX 637 (ALT 250 FOR IP): Performed by: INTERNAL MEDICINE

## 2023-09-18 PROCEDURE — 82947 ASSAY GLUCOSE BLOOD QUANT: CPT

## 2023-09-18 PROCEDURE — 99238 HOSP IP/OBS DSCHRG MGMT 30/<: CPT | Performed by: INTERNAL MEDICINE

## 2023-09-18 RX ORDER — CEFDINIR 300 MG/1
300 CAPSULE ORAL 2 TIMES DAILY
Qty: 4 CAPSULE | Refills: 0 | Status: SHIPPED | OUTPATIENT
Start: 2023-09-18 | End: 2023-09-20

## 2023-09-18 RX ORDER — CHOLECALCIFEROL (VITAMIN D3) 125 MCG
1 CAPSULE ORAL 3 TIMES DAILY
Qty: 30 CAPSULE | Refills: 0 | COMMUNITY
Start: 2023-09-18 | End: 2023-09-28

## 2023-09-18 RX ORDER — BETHANECHOL CHLORIDE 25 MG/1
25 TABLET ORAL 4 TIMES DAILY
Qty: 120 TABLET | Refills: 3 | Status: SHIPPED | OUTPATIENT
Start: 2023-09-18 | End: 2024-01-16

## 2023-09-18 RX ADMIN — ARIPIPRAZOLE 10 MG: 5 TABLET ORAL at 09:03

## 2023-09-18 RX ADMIN — INSULIN GLARGINE 5 UNITS: 100 INJECTION, SOLUTION SUBCUTANEOUS at 09:03

## 2023-09-18 RX ADMIN — BETHANECHOL CHLORIDE 25 MG: 25 TABLET ORAL at 17:39

## 2023-09-18 RX ADMIN — BENZTROPINE MESYLATE 1 MG: 1 TABLET ORAL at 09:05

## 2023-09-18 RX ADMIN — BETHANECHOL CHLORIDE 25 MG: 25 TABLET ORAL at 12:53

## 2023-09-18 RX ADMIN — BETHANECHOL CHLORIDE 25 MG: 25 TABLET ORAL at 09:03

## 2023-09-18 RX ADMIN — LEVOTHYROXINE SODIUM 112 MCG: 0.11 TABLET ORAL at 05:53

## 2023-09-18 RX ADMIN — FLUOXETINE 20 MG: 20 CAPSULE ORAL at 09:03

## 2023-09-18 RX ADMIN — POTASSIUM CHLORIDE, DEXTROSE MONOHYDRATE AND SODIUM CHLORIDE: 150; 5; 900 INJECTION, SOLUTION INTRAVENOUS at 09:03

## 2023-09-18 RX ADMIN — ENOXAPARIN SODIUM 30 MG: 100 INJECTION SUBCUTANEOUS at 09:04

## 2023-09-18 RX ADMIN — MICONAZOLE NITRATE: 20 POWDER TOPICAL at 09:03

## 2023-09-18 ASSESSMENT — ENCOUNTER SYMPTOMS
RHINORRHEA: 0
EYE REDNESS: 0
EYE DISCHARGE: 0
WHEEZING: 0
SINUS PRESSURE: 0
TROUBLE SWALLOWING: 0
DIARRHEA: 1
CONSTIPATION: 0
SHORTNESS OF BREATH: 0
SORE THROAT: 0

## 2023-09-18 NOTE — DISCHARGE INSTRUCTIONS
Follow up with Dr. Deirdre Leiva or your primary physician in 1 week    Follow up with Urology within the next week. Leave bird catheter in until seen by Urology.

## 2023-09-18 NOTE — PLAN OF CARE
Problem: Discharge Planning  Goal: Discharge to home or other facility with appropriate resources  Outcome: Completed  Flowsheets (Taken 9/18/2023 0907)  Discharge to home or other facility with appropriate resources: Identify barriers to discharge with patient and caregiver     Problem: Safety - Adult  Goal: Free from fall injury  Outcome: Completed     Problem: ABCDS Injury Assessment  Goal: Absence of physical injury  Outcome: Completed     Problem: Chronic Conditions and Co-morbidities  Goal: Patient's chronic conditions and co-morbidity symptoms are monitored and maintained or improved  Outcome: Completed  Flowsheets (Taken 9/18/2023 0907)  Care Plan - Patient's Chronic Conditions and Co-Morbidity Symptoms are Monitored and Maintained or Improved: Monitor and assess patient's chronic conditions and comorbid symptoms for stability, deterioration, or improvement     Problem: Gastrointestinal - Adult  Goal: Able to eat  Description: Able to eat  Outcome: Completed  Goal: Able to drink  Outcome: Completed  Goal: Absence of nausea  Outcome: Completed

## 2023-09-18 NOTE — DISCHARGE INSTR - DIET

## 2023-09-19 ENCOUNTER — TELEPHONE (OUTPATIENT)
Dept: SURGERY | Age: 68
End: 2023-09-19

## 2023-09-20 ENCOUNTER — OFFICE VISIT (OUTPATIENT)
Dept: UROLOGY | Age: 68
End: 2023-09-20
Payer: MEDICARE

## 2023-09-20 VITALS
SYSTOLIC BLOOD PRESSURE: 122 MMHG | OXYGEN SATURATION: 100 % | DIASTOLIC BLOOD PRESSURE: 84 MMHG | HEART RATE: 98 BPM | BODY MASS INDEX: 38.24 KG/M2 | WEIGHT: 224 LBS | HEIGHT: 64 IN

## 2023-09-20 DIAGNOSIS — N39.41 URGE INCONTINENCE: ICD-10-CM

## 2023-09-20 DIAGNOSIS — R39.15 URINARY URGENCY: ICD-10-CM

## 2023-09-20 DIAGNOSIS — N32.81 OVERACTIVE BLADDER: Primary | ICD-10-CM

## 2023-09-20 DIAGNOSIS — R33.9 URINARY RETENTION: ICD-10-CM

## 2023-09-20 PROCEDURE — G8427 DOCREV CUR MEDS BY ELIG CLIN: HCPCS | Performed by: UROLOGY

## 2023-09-20 PROCEDURE — 99204 OFFICE O/P NEW MOD 45 MIN: CPT | Performed by: UROLOGY

## 2023-09-20 PROCEDURE — 1090F PRES/ABSN URINE INCON ASSESS: CPT | Performed by: UROLOGY

## 2023-09-20 PROCEDURE — G8417 CALC BMI ABV UP PARAM F/U: HCPCS | Performed by: UROLOGY

## 2023-09-20 PROCEDURE — 3079F DIAST BP 80-89 MM HG: CPT | Performed by: UROLOGY

## 2023-09-20 PROCEDURE — 1036F TOBACCO NON-USER: CPT | Performed by: UROLOGY

## 2023-09-20 PROCEDURE — 3074F SYST BP LT 130 MM HG: CPT | Performed by: UROLOGY

## 2023-09-20 PROCEDURE — 3017F COLORECTAL CA SCREEN DOC REV: CPT | Performed by: UROLOGY

## 2023-09-20 PROCEDURE — 99212 OFFICE O/P EST SF 10 MIN: CPT | Performed by: UROLOGY

## 2023-09-20 PROCEDURE — 1111F DSCHRG MED/CURRENT MED MERGE: CPT | Performed by: UROLOGY

## 2023-09-20 PROCEDURE — G8400 PT W/DXA NO RESULTS DOC: HCPCS | Performed by: UROLOGY

## 2023-09-20 PROCEDURE — 1123F ACP DISCUSS/DSCN MKR DOCD: CPT | Performed by: UROLOGY

## 2023-09-20 PROCEDURE — 0509F URINE INCON PLAN DOCD: CPT | Performed by: UROLOGY

## 2023-09-20 RX ORDER — TOLTERODINE 4 MG/1
4 CAPSULE, EXTENDED RELEASE ORAL DAILY
Qty: 90 CAPSULE | Refills: 3 | Status: SHIPPED | OUTPATIENT
Start: 2023-09-20

## 2023-09-20 NOTE — PROGRESS NOTES
Dr. Ricci Vivar MD MD      Patient:  Franny Steele  YOB: 1955  Date: 9/20/2023  Consult requested from Brock Gordon MD     HISTORY OF PRESENT ILLNESS:   The patient is a 74 y.o. female who presents today for follow-up for the following problem(s): Urinary frequency urgency and incontinenece  Overall the problem(s) : are worsening. Associated Symptoms: No dysuria, gross hematuria. Pain Severity: Pain Score:   0 - No pain       Saw london 4/6/22   Has new onset of mixed urinary incontinence, with urge incontinence. 1 Brief / Day, damp. Recently over last 2 weeks  ? Of urinary retention; admitted for bowel issues  Baez placed within last week  ? OAB     Summary of old records:   (Patient's old records, notes and chart reviewed and summarized above.)    Urinalysis today:  No results found for this visit on 09/20/23. Last BUN and creatinine:  Lab Results   Component Value Date    BUN 2 (L) 09/18/2023     Lab Results   Component Value Date    CREATININE 0.6 09/18/2023       Imaging Reviewed during this Office Visit:   (results were independently reviewed by physician and radiology report verified)    PAST MEDICAL, FAMILY AND SOCIAL HISTORY:  Past Medical History:   Diagnosis Date    Abnormal cardiovascular stress test 03/06/2018    declined cardiac cath or beta blocker    Abnormal echocardiogram 01/18/2018    EF 39-89%, grade 1 Diastolic dysfunction    REBECCA (acute kidney injury) ([address]) 10/06/2018    Anxiety     Depression     Dyspnea on exertion     GERD (gastroesophageal reflux disease)     Hashimoto's disease     Hypercalcemia 05/09/2023    Hyperlipidemia     Iron deficiency anemia     Schizoaffective disorder, bipolar type ([address])     Schizophrenia ([address])     Type 2 diabetes mellitus without complication ([address])     Uterine cancer ([address])     in one place I saw this documented as ovarian cancer, but we do not have any primary documents nor is the patient reliable.      Past Surgical

## 2023-09-20 NOTE — DISCHARGE SUMMARY
note of 09/18/2023,  incorporated for reference herein. HISTORY OF PRESENT ILLNESS AND HOSPITAL COURSE:  The patient is a  61-year-old white female, patient of Kimberly Vasquez MD.  The patient  presented with a small bowel obstruction, fairly slow turnaround. The  patient ultimately was able to have the diet advanced and tolerated. The patient able to be discharged to home on 09/18/2023. Imaging  studies as set forth above. The patient was seen in the latter part of  her hospitalization by Dr. Loraine Roe, General Surgery. At this point, the  patient discharged to home. Unfortunately, she may have recurrence of  these events; however, at this point, no further indication for any  surgeries or any other interventions. The patient to be seen by her  personal physician outpatient setting likely due to the patient's prior  history of HELENE, possible scar tissue. The patient has urinary tract infection and was treated with Rocephin,  converted to LOCK RAFI. Diabetic, the patient's blood glucose level 125  to 133. Hypertension, variable blood pressure, 148/95 around the time  of discharge. Hypothyroidism, due to prior Hashimoto's thyroiditis,  replacement therapy. Schizophrenia, bipolar, we were able to control  during this hospitalization. Medications initiated early in the  hospitalization to avoid backsliding. LABORATORY DATA:  Around the time of discharge, white cell count 7.6,  hemoglobin 11.8, hematocrit 36.4, platelets 415,932. Sodium 137,  potassium 4.5, chloride 102, CO2 27, BUN 2, creatinine 0.6, glucose 125,  calcium 9.0. GFR greater than 60. DISCHARGE INSTRUCTIONS/FOLLOWUP:  Discharged to home on 09/18/2023. DIET:  Cardiac, diabetic. ACTIVITY:  As tolerated. CONDITION AT DISCHARGE:  Fair, improved. MEDICATIONS, NEW:  Due to the patient's urinary retention this  hospitalization, Baez catheter placed, bethanechol initiated 25 mg four  times a day; cefdinir (Omnicef) 300 mg p.o.

## 2023-11-01 ENCOUNTER — OFFICE VISIT (OUTPATIENT)
Dept: UROLOGY | Age: 68
End: 2023-11-01
Payer: MEDICARE

## 2023-11-01 VITALS
HEART RATE: 56 BPM | OXYGEN SATURATION: 96 % | DIASTOLIC BLOOD PRESSURE: 82 MMHG | WEIGHT: 207 LBS | HEIGHT: 64 IN | SYSTOLIC BLOOD PRESSURE: 130 MMHG | BODY MASS INDEX: 35.34 KG/M2

## 2023-11-01 DIAGNOSIS — R33.9 URINARY RETENTION: ICD-10-CM

## 2023-11-01 DIAGNOSIS — N39.41 URGE INCONTINENCE: ICD-10-CM

## 2023-11-01 DIAGNOSIS — R39.15 URINARY URGENCY: ICD-10-CM

## 2023-11-01 DIAGNOSIS — N32.81 OVERACTIVE BLADDER: Primary | ICD-10-CM

## 2023-11-01 DIAGNOSIS — N39.46 MIXED STRESS AND URGE URINARY INCONTINENCE: ICD-10-CM

## 2023-11-01 PROCEDURE — 99212 OFFICE O/P EST SF 10 MIN: CPT | Performed by: UROLOGY

## 2023-11-01 PROCEDURE — G8400 PT W/DXA NO RESULTS DOC: HCPCS | Performed by: UROLOGY

## 2023-11-01 PROCEDURE — 1123F ACP DISCUSS/DSCN MKR DOCD: CPT | Performed by: UROLOGY

## 2023-11-01 PROCEDURE — 3017F COLORECTAL CA SCREEN DOC REV: CPT | Performed by: UROLOGY

## 2023-11-01 PROCEDURE — 1036F TOBACCO NON-USER: CPT | Performed by: UROLOGY

## 2023-11-01 PROCEDURE — 1090F PRES/ABSN URINE INCON ASSESS: CPT | Performed by: UROLOGY

## 2023-11-01 PROCEDURE — 3079F DIAST BP 80-89 MM HG: CPT | Performed by: UROLOGY

## 2023-11-01 PROCEDURE — 99214 OFFICE O/P EST MOD 30 MIN: CPT | Performed by: UROLOGY

## 2023-11-01 PROCEDURE — G8427 DOCREV CUR MEDS BY ELIG CLIN: HCPCS | Performed by: UROLOGY

## 2023-11-01 PROCEDURE — 0509F URINE INCON PLAN DOCD: CPT | Performed by: UROLOGY

## 2023-11-01 PROCEDURE — 3075F SYST BP GE 130 - 139MM HG: CPT | Performed by: UROLOGY

## 2023-11-01 PROCEDURE — G8484 FLU IMMUNIZE NO ADMIN: HCPCS | Performed by: UROLOGY

## 2023-11-01 PROCEDURE — G8417 CALC BMI ABV UP PARAM F/U: HCPCS | Performed by: UROLOGY

## 2023-11-01 RX ORDER — BENZTROPINE MESYLATE 1 MG/1
1 TABLET ORAL DAILY
COMMUNITY
Start: 2023-10-02

## 2023-11-01 NOTE — PROGRESS NOTES
Dr. Madeline Santana MD MD      Patient:  Santino Metzger  YOB: 1955  Date: 11/1/2023  Consult requested from Esa Barahona MD     HISTORY OF PRESENT ILLNESS:   The patient is a 74 y.o. female who presents today for follow-up for the following problem(s): Urinary frequency urgency and incontinenece  Overall the problem(s) : are worsening. Associated Symptoms: No dysuria, gross hematuria. Pain Severity:         Saw [place] 4/6/22   Has new onset of mixed urinary incontinence, with urge incontinence. 1 Brief / Day, damp. Recently over last 2 weeks  ? Of urinary retention; admitted for bowel issues  Baez placed within last week  ? OAB     Summary of old records:   (Patient's old records, notes and chart reviewed and summarized above.)    Urinalysis today:  No results found for this visit on 11/01/23. Last BUN and creatinine:  Lab Results   Component Value Date    BUN 2 (L) 09/18/2023     Lab Results   Component Value Date    CREATININE 0.6 09/18/2023       Imaging Reviewed during this Office Visit:   (results were independently reviewed by physician and radiology report verified)    PAST MEDICAL, FAMILY AND SOCIAL HISTORY:  Past Medical History:   Diagnosis Date    Abnormal cardiovascular stress test 03/06/2018    declined cardiac cath or beta blocker    Abnormal echocardiogram 01/18/2018    EF 23-24%, grade 1 Diastolic dysfunction    REBECCA (acute kidney injury) ([address]) 10/06/2018    Anxiety     Depression     Dyspnea on exertion     GERD (gastroesophageal reflux disease)     Hashimoto's disease     Hypercalcemia 05/09/2023    Hyperlipidemia     Iron deficiency anemia     Schizoaffective disorder, bipolar type ([address])     Schizophrenia ([address])     Type 2 diabetes mellitus without complication ([address])     Uterine cancer ([address])     in one place I saw this documented as ovarian cancer, but we do not have any primary documents nor is the patient reliable.      Past Surgical History:   Procedure

## 2024-04-03 ENCOUNTER — INITIAL CONSULT (OUTPATIENT)
Dept: SURGERY | Age: 69
End: 2024-04-03

## 2024-04-03 VITALS
OXYGEN SATURATION: 94 % | HEART RATE: 69 BPM | BODY MASS INDEX: 37.9 KG/M2 | HEIGHT: 64 IN | DIASTOLIC BLOOD PRESSURE: 78 MMHG | SYSTOLIC BLOOD PRESSURE: 132 MMHG | WEIGHT: 222 LBS

## 2024-04-03 DIAGNOSIS — Z12.11 SCREENING FOR COLON CANCER: Primary | ICD-10-CM

## 2024-04-03 RX ORDER — LISINOPRIL 10 MG/1
TABLET ORAL
COMMUNITY
Start: 2023-07-06

## 2024-04-03 RX ORDER — ASPIRIN 81 MG/81MG
CAPSULE ORAL
COMMUNITY
Start: 2023-07-06

## 2024-04-03 RX ORDER — TOLTERODINE 4 MG/1
CAPSULE, EXTENDED RELEASE ORAL
COMMUNITY
Start: 2023-07-06 | End: 2024-04-03

## 2024-04-03 RX ORDER — OXYBUTYNIN CHLORIDE 5 MG/1
TABLET ORAL
COMMUNITY
Start: 2023-07-06 | End: 2024-04-03

## 2024-04-03 RX ORDER — NYSTATIN 100000 U/G
CREAM TOPICAL
COMMUNITY

## 2024-04-03 RX ORDER — FLUOXETINE HYDROCHLORIDE 20 MG/1
CAPSULE ORAL
COMMUNITY
Start: 2024-03-10

## 2024-04-03 RX ORDER — KETOCONAZOLE 20 MG/G
1 CREAM TOPICAL
COMMUNITY
Start: 2024-01-22

## 2024-04-04 RX ORDER — POLYETHYLENE GLYCOL 3350, SODIUM SULFATE ANHYDROUS, SODIUM BICARBONATE, SODIUM CHLORIDE, POTASSIUM CHLORIDE 236; 22.74; 6.74; 5.86; 2.97 G/4L; G/4L; G/4L; G/4L; G/4L
4 POWDER, FOR SOLUTION ORAL ONCE
Qty: 4000 ML | Refills: 0 | Status: SHIPPED | OUTPATIENT
Start: 2024-04-04 | End: 2024-04-04

## 2024-04-04 RX ORDER — BISACODYL 5 MG/1
TABLET, DELAYED RELEASE ORAL
Qty: 2 TABLET | Refills: 0 | Status: SHIPPED | OUTPATIENT
Start: 2024-04-04

## 2024-05-15 ENCOUNTER — HOSPITAL ENCOUNTER (OUTPATIENT)
Age: 69
Setting detail: OUTPATIENT SURGERY
Discharge: HOME | End: 2024-05-15
Attending: SURGERY | Admitting: SURGERY
Payer: MEDICARE

## 2024-05-15 ENCOUNTER — ANESTHESIA (OUTPATIENT)
Dept: OPERATING ROOM | Age: 69
End: 2024-05-15
Payer: MEDICARE

## 2024-05-15 ENCOUNTER — ANESTHESIA EVENT (OUTPATIENT)
Dept: OPERATING ROOM | Age: 69
End: 2024-05-15
Payer: MEDICARE

## 2024-05-15 VITALS
HEART RATE: 75 BPM | SYSTOLIC BLOOD PRESSURE: 132 MMHG | DIASTOLIC BLOOD PRESSURE: 73 MMHG | BODY MASS INDEX: 37.63 KG/M2 | HEIGHT: 64 IN | TEMPERATURE: 96.4 F | OXYGEN SATURATION: 98 % | RESPIRATION RATE: 16 BRPM | WEIGHT: 220.4 LBS

## 2024-05-15 LAB — GLUCOSE BLD-MCNC: 93 MG/DL (ref 65–105)

## 2024-05-15 PROCEDURE — 3700000001 HC ADD 15 MINUTES (ANESTHESIA): Performed by: SURGERY

## 2024-05-15 PROCEDURE — 3609027000 HC COLONOSCOPY: Performed by: SURGERY

## 2024-05-15 PROCEDURE — 3700000000 HC ANESTHESIA ATTENDED CARE: Performed by: SURGERY

## 2024-05-15 PROCEDURE — 7100000011 HC PHASE II RECOVERY - ADDTL 15 MIN: Performed by: SURGERY

## 2024-05-15 PROCEDURE — 2709999900 HC NON-CHARGEABLE SUPPLY: Performed by: SURGERY

## 2024-05-15 PROCEDURE — 82947 ASSAY GLUCOSE BLOOD QUANT: CPT

## 2024-05-15 PROCEDURE — 2580000003 HC RX 258: Performed by: SURGERY

## 2024-05-15 PROCEDURE — 6360000002 HC RX W HCPCS

## 2024-05-15 PROCEDURE — 7100000010 HC PHASE II RECOVERY - FIRST 15 MIN: Performed by: SURGERY

## 2024-05-15 PROCEDURE — 2500000003 HC RX 250 WO HCPCS

## 2024-05-15 RX ORDER — SODIUM CHLORIDE 9 MG/ML
INJECTION, SOLUTION INTRAVENOUS PRN
Status: DISCONTINUED | OUTPATIENT
Start: 2024-05-15 | End: 2024-05-15 | Stop reason: HOSPADM

## 2024-05-15 RX ORDER — SODIUM CHLORIDE, SODIUM LACTATE, POTASSIUM CHLORIDE, CALCIUM CHLORIDE 600; 310; 30; 20 MG/100ML; MG/100ML; MG/100ML; MG/100ML
INJECTION, SOLUTION INTRAVENOUS CONTINUOUS
Status: DISCONTINUED | OUTPATIENT
Start: 2024-05-15 | End: 2024-05-15 | Stop reason: HOSPADM

## 2024-05-15 RX ORDER — PROPOFOL 10 MG/ML
INJECTION, EMULSION INTRAVENOUS PRN
Status: DISCONTINUED | OUTPATIENT
Start: 2024-05-15 | End: 2024-05-15 | Stop reason: SDUPTHER

## 2024-05-15 RX ORDER — SODIUM CHLORIDE 0.9 % (FLUSH) 0.9 %
5-40 SYRINGE (ML) INJECTION EVERY 12 HOURS SCHEDULED
Status: DISCONTINUED | OUTPATIENT
Start: 2024-05-15 | End: 2024-05-15 | Stop reason: HOSPADM

## 2024-05-15 RX ORDER — SODIUM CHLORIDE 0.9 % (FLUSH) 0.9 %
5-40 SYRINGE (ML) INJECTION PRN
Status: DISCONTINUED | OUTPATIENT
Start: 2024-05-15 | End: 2024-05-15 | Stop reason: HOSPADM

## 2024-05-15 RX ORDER — LIDOCAINE HYDROCHLORIDE 40 MG/ML
INJECTION, SOLUTION RETROBULBAR PRN
Status: DISCONTINUED | OUTPATIENT
Start: 2024-05-15 | End: 2024-05-15 | Stop reason: SDUPTHER

## 2024-05-15 RX ADMIN — SODIUM CHLORIDE, POTASSIUM CHLORIDE, SODIUM LACTATE AND CALCIUM CHLORIDE: 600; 310; 30; 20 INJECTION, SOLUTION INTRAVENOUS at 10:32

## 2024-05-15 RX ADMIN — PROPOFOL 150 MG: 10 INJECTION, EMULSION INTRAVENOUS at 11:06

## 2024-05-15 RX ADMIN — LIDOCAINE HYDROCHLORIDE 80 MG: 40 INJECTION, SOLUTION RETROBULBAR; TOPICAL at 11:06

## 2024-05-15 RX ADMIN — PROPOFOL 150 MCG/KG/MIN: 10 INJECTION, EMULSION INTRAVENOUS at 11:07

## 2024-05-15 ASSESSMENT — PAIN - FUNCTIONAL ASSESSMENT
PAIN_FUNCTIONAL_ASSESSMENT: NONE - DENIES PAIN
PAIN_FUNCTIONAL_ASSESSMENT: ADULT NONVERBAL PAIN SCALE (NPVS)
PAIN_FUNCTIONAL_ASSESSMENT: 0-10
PAIN_FUNCTIONAL_ASSESSMENT: 0-10

## 2024-05-15 ASSESSMENT — ENCOUNTER SYMPTOMS: SHORTNESS OF BREATH: 1

## 2024-05-15 NOTE — ANESTHESIA PRE PROCEDURE
Value Date/Time     09/18/2023 05:02 AM    K 4.5 09/18/2023 05:02 AM     09/18/2023 05:02 AM    CO2 27 09/18/2023 05:02 AM    BUN 2 09/18/2023 05:02 AM    CREATININE 0.6 09/18/2023 05:02 AM    GFRAA >60 12/09/2020 11:12 AM    LABGLOM >60 09/18/2023 05:02 AM    GLUCOSE 125 09/18/2023 05:02 AM    GLUCOSE 110 07/31/2023 03:03 PM    PROT 7.5 07/31/2023 03:03 PM    CALCIUM 10.8 04/29/2024 09:05 AM    BILITOT 0.7 09/13/2023 04:49 PM    ALKPHOS 72 09/13/2023 04:49 PM    AST 17 09/13/2023 04:49 PM    ALT 13 09/13/2023 04:49 PM       POC Tests: No results for input(s): \"POCGLU\", \"POCNA\", \"POCK\", \"POCCL\", \"POCBUN\", \"POCHEMO\", \"POCHCT\" in the last 72 hours.    Coags:   Lab Results   Component Value Date/Time    PROTIME 12.3 10/06/2018 07:14 PM    INR 1.2 10/06/2018 07:14 PM    APTT 30.1 10/06/2018 07:14 PM       HCG (If Applicable): No results found for: \"PREGTESTUR\", \"PREGSERUM\", \"HCG\", \"HCGQUANT\"     ABGs: No results found for: \"PHART\", \"PO2ART\", \"DOD3ZPV\", \"TFH3GNM\", \"BEART\", \"Q4HMYZFI\"     Type & Screen (If Applicable):  No results found for: \"LABABO\"    Drug/Infectious Status (If Applicable):  No results found for: \"HIV\", \"HEPCAB\"    COVID-19 Screening (If Applicable): No results found for: \"COVID19\"        Anesthesia Evaluation  Patient summary reviewed and Nursing notes reviewed  Airway: Mallampati: III  TM distance: >3 FB     Mouth opening: > = 3 FB   Dental: normal exam         Pulmonary:Negative Pulmonary ROS   (+)  COPD:  shortness of breath:                                    Cardiovascular:    (+) hypertension:, CHF:, WALKER:        Rhythm: regular                      Neuro/Psych:   (+) psychiatric history:            GI/Hepatic/Renal:   (+) GERD:, bowel prep, morbid obesity          Endo/Other:    (+) DiabetesType II DM, hypothyroidism::..                 Abdominal:             Vascular:          Other Findings:             Anesthesia Plan      general and TIVA     ASA 3       Induction:

## 2024-05-15 NOTE — H&P
ARIPiprazole (ABILIFY) 10 MG tablet Take 1 tablet by mouth daily      metFORMIN (GLUCOPHAGE-XR) 750 MG extended release tablet Take 2 tablets by mouth daily      LORazepam (ATIVAN) 1 MG tablet Take 1 tablet by mouth daily as needed for Anxiety. (Patient not taking: Reported on 2023)      levothyroxine (SYNTHROID) 112 MCG tablet TAKE 1 TABLET BY MOUTH ONCE DAILY ON AN EMPTY STOMACH         No Known Allergies    Family History   Problem Relation Age of Onset    Uterine Cancer Mother     Cancer Sister     Colon Cancer Paternal Uncle     Crohn's Disease Niece/Nephew        Social History     Socioeconomic History    Marital status:      Spouse name: Not on file    Number of children: Not on file    Years of education: Not on file    Highest education level: Not on file   Occupational History    Not on file   Tobacco Use    Smoking status: Former     Current packs/day: 0.00     Average packs/day: 1 pack/day for 31.1 years (31.1 ttl pk-yrs)     Types: Cigarettes     Start date: 1977     Quit date: 2008     Years since quittin.3    Smokeless tobacco: Never   Vaping Use    Vaping Use: Some days   Substance and Sexual Activity    Alcohol use: No    Drug use: Never    Sexual activity: Never   Other Topics Concern    Not on file   Social History Narrative    Not on file     Social Determinants of Health     Financial Resource Strain: Not on file   Food Insecurity: Not on file   Transportation Needs: Not on file   Physical Activity: Not on file   Stress: Not on file   Social Connections: Not on file   Intimate Partner Violence: Not on file   Housing Stability: Not on file       ROS:   Review of Systems - Negative except as noted in HPI      Objective   There were no vitals filed for this visit.  General:in no apparent distress  Eyes: No gross abnormalities.  Ears, Nose, Throat: hearing grossly normal bilaterally  Neck: neck supple and non tender without mass  Lungs: clear to auscultation without

## 2024-05-15 NOTE — ANESTHESIA POSTPROCEDURE EVALUATION
Department of Anesthesiology  Postprocedure Note    Patient: Tootie Parada  MRN: [id number]  YOB: 1955  Date of evaluation: 5/15/2024    Procedure Summary       Date: 05/15/24 Room / Location: [facility] / [facility]    Anesthesia Start: 1103 Anesthesia Stop:     Procedure: Colonoscopy not completed (Anus) Diagnosis:       History of colon polyps      Family history of colon cancer    Surgeons: John Bhat DO Responsible Provider: Chucho Abraham APRN - CRNA    Anesthesia Type: General, TIVA ASA Status: 3            Anesthesia Type: General, TIVA    Marie Phase I: Marie Score: 10    Marie Phase II:      Anesthesia Post Evaluation    Patient location during evaluation: bedside  Patient participation: complete - patient participated  Level of consciousness: sleepy but conscious  Airway patency: patent  Nausea & Vomiting: no nausea  Cardiovascular status: hemodynamically stable  Respiratory status: nasal cannula  Hydration status: euvolemic  Pain management: satisfactory to patient    No notable events documented.

## 2024-05-15 NOTE — OP NOTE
[facility]                [address] [place] [address]                            OPERATIVE REPORT      PATIENT NAME: DIANA GONZÁLES                   : 1955  MED REC NO: [id number]                         ROOM: [facility]  ACCOUNT NO: [id number]                       ADMIT DATE: 05/15/2024  PROVIDER: John Bhat DO      DATE OF PROCEDURE:  05/15/2024    SURGEON:  John Bhat DO    ASSISTANT:  None.    PREOPERATIVE DIAGNOSES:    1. Screening.  2. History of colon polyps.    POSTOPERATIVE DIAGNOSES:    1. Screening.  2. History of colon polyps.  3. Poor bowel prep.    PROCEDURE:  Colonoscopy.    ANESTHESIA:  MAC.    ESTIMATED BLOOD LOSS:  Minimal.    FLUIDS:  Per Anesthesia record.    COMPLICATIONS:  None.    SPECIMENS:  None.    INDICATION FOR PROCEDURE:  The patient is a 68-year-old female who was referred to my office for screening colonoscopy.  After evaluation, decision was made to proceed.  Prior to the time of the procedure, risks, benefits, and alternatives were explained to the patient and consent was obtained.    DESCRIPTION OF PROCEDURE:  The patient was brought to the endoscopy suite, kept on preop gurney, placed in left lateral decubitus position.  Monitoring devices were placed.  MAC anesthesia was induced.  After induction of anesthesia, a time-out was performed.  The correct patient and procedure were verified.  Digital rectal exam was performed, which showed no abnormalities.  The Olympus video endoscope was lubricated, inserted into the patient's rectum, was gently insufflated with air.  As I began to advance the scope, there was a fairly significant stool burden in the lower colon, but it seemed to be fairly thin and I was able to insufflate and see around it.  So, I continued to advance the scope.  The scope was advanced all the way into the transverse colon, but as we were advancing through the transverse colon and making the turn

## 2024-05-18 PROBLEM — Z12.11 SCREEN FOR COLON CANCER: Status: ACTIVE | Noted: 2024-05-18

## 2024-05-24 ENCOUNTER — TELEPHONE (OUTPATIENT)
Dept: SURGERY | Age: 69
End: 2024-05-24

## 2024-05-24 NOTE — TELEPHONE ENCOUNTER
Left VM for patients daughter Tonia on 5/23/24 to return call to office regarding scheduling patient for repeat colonoscopy with 2 day bowel prep.

## 2024-06-14 RX ORDER — BISACODYL 5 MG/1
TABLET, DELAYED RELEASE ORAL
Qty: 6 TABLET | Refills: 0 | Status: SHIPPED | OUTPATIENT
Start: 2024-06-14

## 2024-06-14 RX ORDER — POLYETHYLENE GLYCOL 3350, SODIUM SULFATE ANHYDROUS, SODIUM BICARBONATE, SODIUM CHLORIDE, POTASSIUM CHLORIDE 236; 22.74; 6.74; 5.86; 2.97 G/4L; G/4L; G/4L; G/4L; G/4L
POWDER, FOR SOLUTION ORAL
Qty: 4000 ML | Refills: 0 | Status: SHIPPED | OUTPATIENT
Start: 2024-06-14

## 2024-06-14 NOTE — TELEPHONE ENCOUNTER
Patients daughter Tonia returned call to office to schedule patient for repeat colonoscopy with 2 day Nulytely bowel prep. Pre-surgery and bowel prep instructions went over with Tonia at this time and denies any questions. Informed she will need to purchase Magnesium Citrate OTC. All medication holds reviewed and copy of instructions mailed to Tonia and patient at this time. Acknowledges understanding of procedure and will call with any further questions. [facility] aware of scheduled procedure.       Colonoscopy:                   Diagnostic - History of Polyps     Surgery Date: Wednesday 8/7/24    Location: [place]       Pharmacy: [facility]

## 2024-06-17 PROBLEM — Z12.11 SCREEN FOR COLON CANCER: Status: RESOLVED | Noted: 2024-05-18 | Resolved: 2024-06-17

## 2024-08-07 ENCOUNTER — ANESTHESIA (OUTPATIENT)
Dept: OPERATING ROOM | Age: 69
End: 2024-08-07
Payer: MEDICARE

## 2024-08-07 ENCOUNTER — HOSPITAL ENCOUNTER (OUTPATIENT)
Age: 69
Setting detail: OUTPATIENT SURGERY
Discharge: HOME | End: 2024-08-07
Attending: SURGERY | Admitting: SURGERY
Payer: MEDICARE

## 2024-08-07 ENCOUNTER — ANESTHESIA EVENT (OUTPATIENT)
Dept: OPERATING ROOM | Age: 69
End: 2024-08-07
Payer: MEDICARE

## 2024-08-07 VITALS
RESPIRATION RATE: 14 BRPM | HEART RATE: 88 BPM | WEIGHT: 237 LBS | BODY MASS INDEX: 40.46 KG/M2 | OXYGEN SATURATION: 97 % | HEIGHT: 64 IN | DIASTOLIC BLOOD PRESSURE: 79 MMHG | TEMPERATURE: 98.2 F | SYSTOLIC BLOOD PRESSURE: 158 MMHG

## 2024-08-07 DIAGNOSIS — Z86.010 PERSONAL HISTORY OF COLONIC POLYPS: ICD-10-CM

## 2024-08-07 LAB — GLUCOSE BLD-MCNC: 90 MG/DL (ref 65–105)

## 2024-08-07 PROCEDURE — 82947 ASSAY GLUCOSE BLOOD QUANT: CPT

## 2024-08-07 PROCEDURE — 6360000002 HC RX W HCPCS: Performed by: NURSE ANESTHETIST, CERTIFIED REGISTERED

## 2024-08-07 PROCEDURE — 88305 TISSUE EXAM BY PATHOLOGIST: CPT

## 2024-08-07 PROCEDURE — 2709999900 HC NON-CHARGEABLE SUPPLY: Performed by: SURGERY

## 2024-08-07 PROCEDURE — 3700000000 HC ANESTHESIA ATTENDED CARE: Performed by: SURGERY

## 2024-08-07 PROCEDURE — 3700000001 HC ADD 15 MINUTES (ANESTHESIA): Performed by: SURGERY

## 2024-08-07 PROCEDURE — 7100000011 HC PHASE II RECOVERY - ADDTL 15 MIN: Performed by: SURGERY

## 2024-08-07 PROCEDURE — 2580000003 HC RX 258: Performed by: SURGERY

## 2024-08-07 PROCEDURE — 7100000010 HC PHASE II RECOVERY - FIRST 15 MIN: Performed by: SURGERY

## 2024-08-07 PROCEDURE — 3609010400 HC COLONOSCOPY POLYPECTOMY HOT BIOPSY: Performed by: SURGERY

## 2024-08-07 PROCEDURE — 2500000003 HC RX 250 WO HCPCS: Performed by: NURSE ANESTHETIST, CERTIFIED REGISTERED

## 2024-08-07 RX ORDER — DEXMEDETOMIDINE HYDROCHLORIDE 100 UG/ML
INJECTION, SOLUTION INTRAVENOUS PRN
Status: DISCONTINUED | OUTPATIENT
Start: 2024-08-07 | End: 2024-08-07 | Stop reason: SDUPTHER

## 2024-08-07 RX ORDER — PROPOFOL 10 MG/ML
INJECTION, EMULSION INTRAVENOUS PRN
Status: DISCONTINUED | OUTPATIENT
Start: 2024-08-07 | End: 2024-08-07 | Stop reason: SDUPTHER

## 2024-08-07 RX ORDER — ALENDRONATE SODIUM 70 MG/1
70 TABLET ORAL WEEKLY
COMMUNITY
Start: 2024-05-13

## 2024-08-07 RX ORDER — SODIUM CHLORIDE 0.9 % (FLUSH) 0.9 %
5-40 SYRINGE (ML) INJECTION EVERY 12 HOURS SCHEDULED
Status: DISCONTINUED | OUTPATIENT
Start: 2024-08-07 | End: 2024-08-07 | Stop reason: HOSPADM

## 2024-08-07 RX ORDER — SODIUM CHLORIDE 0.9 % (FLUSH) 0.9 %
5-40 SYRINGE (ML) INJECTION PRN
Status: DISCONTINUED | OUTPATIENT
Start: 2024-08-07 | End: 2024-08-07 | Stop reason: HOSPADM

## 2024-08-07 RX ORDER — SODIUM CHLORIDE, SODIUM LACTATE, POTASSIUM CHLORIDE, CALCIUM CHLORIDE 600; 310; 30; 20 MG/100ML; MG/100ML; MG/100ML; MG/100ML
INJECTION, SOLUTION INTRAVENOUS CONTINUOUS
Status: DISCONTINUED | OUTPATIENT
Start: 2024-08-07 | End: 2024-08-07 | Stop reason: HOSPADM

## 2024-08-07 RX ORDER — SODIUM CHLORIDE 9 MG/ML
INJECTION, SOLUTION INTRAVENOUS PRN
Status: DISCONTINUED | OUTPATIENT
Start: 2024-08-07 | End: 2024-08-07 | Stop reason: HOSPADM

## 2024-08-07 RX ADMIN — PROPOFOL 100 MG: 10 INJECTION, EMULSION INTRAVENOUS at 07:23

## 2024-08-07 RX ADMIN — PROPOFOL 180 MCG/KG/MIN: 10 INJECTION, EMULSION INTRAVENOUS at 07:25

## 2024-08-07 RX ADMIN — DEXMEDETOMIDINE HYDROCHLORIDE 10 MCG: 100 INJECTION, SOLUTION INTRAVENOUS at 07:23

## 2024-08-07 RX ADMIN — SODIUM CHLORIDE, POTASSIUM CHLORIDE, SODIUM LACTATE AND CALCIUM CHLORIDE: 600; 310; 30; 20 INJECTION, SOLUTION INTRAVENOUS at 07:14

## 2024-08-07 ASSESSMENT — PAIN SCALES - GENERAL
PAINLEVEL_OUTOF10: 0
PAINLEVEL_OUTOF10: 0

## 2024-08-07 ASSESSMENT — ENCOUNTER SYMPTOMS: SHORTNESS OF BREATH: 1

## 2024-08-07 ASSESSMENT — PAIN - FUNCTIONAL ASSESSMENT: PAIN_FUNCTIONAL_ASSESSMENT: 0-10

## 2024-08-07 NOTE — H&P
Subjective   Tootie Parada is a 68 y.o. female who presents today for repeat screening colonoscopy.  Last was in 2014 with polyps removed.  Pt denies any recent changes in bowel movements, blood per rectum, melena or unintended weight loss.  Reports family hx of colon cancer in second degree relative.     Past Medical History        Past Medical History:   Diagnosis Date    Abnormal cardiovascular stress test 03/06/2018     declined cardiac cath or beta blocker    Abnormal echocardiogram 01/18/2018     EF 50-55%, grade 1 Diastolic dysfunction    REBECCA (acute kidney injury) ([facility]) 10/06/2018    Anxiety      Depression      Dyspnea on exertion      GERD (gastroesophageal reflux disease)      Hashimoto's disease      Hypercalcemia 05/09/2023    Hyperlipidemia      Iron deficiency anemia      Schizoaffective disorder, bipolar type (HCC)      Schizophrenia (HCC)      Type 2 diabetes mellitus without complication (H[facility])      Uterine cancer (HCC)       in one place I saw this documented as ovarian cancer, but we do not have any primary documents nor is the patient reliable.            Past Surgical History         Past Surgical History:   Procedure Laterality Date    COLONOSCOPY        HYSTERECTOMY, TOTAL ABDOMINAL (CERVIX REMOVED)                Current Facility-Administered Medications   No current facility-administered medications for this encounter.             Current Outpatient Medications   Medication Sig Dispense Refill    bisacodyl 5 MG EC tablet Take two tablets at noon day before procedure. 2 tablet 0    Aspirin (VAZALORE) 81 MG CAPS Aspirin Active 81 MG PO DAILY July 5th, 2023 11:00pm (Patient not taking: Reported on 4/3/2024)        FLUoxetine (PROZAC) 20 MG capsule TAKE 1 CAPSULE BY MOUTH ONCE DAILY IN THE MORNING        ketoconazole (NIZORAL) 2 % cream 1 Application        lisinopril (PRINIVIL;ZESTRIL) 10 MG tablet Lisinopril Active 10 MG PO DAILY July 5th, 2023 11:00pm (Patient not taking: Reported on 4/3/2024)

## 2024-08-07 NOTE — ANESTHESIA POSTPROCEDURE EVALUATION
Department of Anesthesiology  Postprocedure Note    Patient: Tootie Parada  MRN: [id number]  YOB: 1955  Date of evaluation: 8/7/2024    Procedure Summary       Date: 08/07/24 [facility]    Anesthesia Start: 0720 Anesthesia Stop: 0750    Procedure: COLONOSCOPY POLYPECTOMY HOT BIOPSY Diagnosis:       Personal history of colonic polyps      (Personal history of colonic polyps [Z86.010])    Surgeons: John Bhat DO Responsible Provider: Syd De Oliveira APRN - CRNA    Anesthesia Type: General, TIVA ASA Status: 3            Anesthesia Type: General, TIVA    Marie Phase I: Marie Score: 10    Marie Phase II: Marie Score: 9    Anesthesia Post Evaluation    Patient location during evaluation: bedside  Level of consciousness: sleepy but conscious  Airway patency: patent  Nausea & Vomiting: no nausea and no vomiting  Cardiovascular status: hemodynamically stable  Respiratory status: spontaneous ventilation  Hydration status: stable  Pain management: satisfactory to patient    No notable events documented.

## 2024-08-07 NOTE — PROCEDURES
[id number] [place] [address]                               COLONOSCOPY      PATIENT NAME: DIANA GONZÁLES                   : 1955  MED REC NO: [id number]                         ROOM: [facility]  ACCOUNT NO: [id number]                       ADMIT DATE: 2024  PROVIDER: John Bhat DO      DATE OF PROCEDURE: 2024    ASSISTANT:  None.    PREOPERATIVE DIAGNOSES:    1. Screening.  2. History of colon polyps.  3. History of poor bowel prep.    POSTOPERATIVE DIAGNOSES:    1. Screening.  2. History of colon polyps.  3. History of poor bowel prep.  4. Poor bowel prep.  5. Colon polyps.  6. Diverticulosis.    PROCEDURE:  Colonoscopy with hot snare polypectomy.    ANESTHESIA:  MAC.    ESTIMATED BLOOD LOSS:  Minimal.    FLUIDS:  Per Anesthesia record.    COMPLICATIONS:  None.    SPECIMENS:    1. Polyp in ascending colon removed with hot snare.  2. Polyp at 20 cm, removed with hot snare.    INDICATION FOR PROCEDURE:  The patient is a 68-year-old female referred to my office initially for screening colonoscopy.  The first attempt was complicated by poor bowel prep, and decision was made to repeat with extended bowel prep.  Prior to the day of the procedure, risks, benefits, and alternatives were explained to the patient and consent was obtained.    DESCRIPTION OF PROCEDURE:  The patient was brought to the endoscopy suite, kept on preop gurney, placed in left lateral decubitus position.  Monitoring devices were placed.  MAC anesthesia was induced.  After induction of anesthesia, a time-out was performed and correct patient and procedure were verified.  Digital rectal exam was performed, which showed no abnormalities.  The Olympus video endoscope was lubricated and inserted in the patient's rectum, which was gently insufflated with air.  Scope was then slowly advanced through colon under visualization.  As we were advancing, we were

## 2024-08-07 NOTE — ANESTHESIA PRE PROCEDURE
(31.1 ttl pk-yrs)     Types: Cigarettes     Start date: 1977     Quit date: 2008     Years since quittin.5   • Smokeless tobacco: Never   Substance Use Topics   • Alcohol use: No                                Counseling given: Not Answered      Vital Signs (Current):   Vitals:    24 0649   BP: (!) 144/97   Pulse: 72   Resp: 20   Temp: 36.7 °C (98 °F)   TempSrc: Infrared   SpO2: 94%   Weight: 107.5 kg (237 lb)   Height: 1.626 m (5' 4\")                                              BP Readings from Last 3 Encounters:   24 (!) 144/97   05/15/24 132/73   24 132/78       NPO Status: Time of last liquid consumption: 2300                        Time of last solid consumption: 1200                        Date of last liquid consumption: 24                        Date of last solid food consumption: 24    BMI:   Wt Readings from Last 3 Encounters:   24 107.5 kg (237 lb)   05/15/24 100 kg (220 lb 6.4 oz)   24 100.7 kg (222 lb)     Body mass index is 40.68 kg/m².    CBC:   Lab Results   Component Value Date/Time    WBC 9.6 2024 10:10 AM    RBC 4.71 2024 10:10 AM    RBC 4.49 2023 03:03 PM    HGB 13.0 2024 10:10 AM    HCT 43.4 2024 10:10 AM    MCV 92.2 2024 10:10 AM    RDW 14.3 2024 10:10 AM    .4 2024 10:10 AM       CMP:   Lab Results   Component Value Date/Time     2023 05:02 AM    K 4.5 2023 05:02 AM     2023 05:02 AM    CO2 27 2023 05:02 AM    BUN 2 2023 05:02 AM    CREATININE 0.6 2023 05:02 AM    GFRAA >60 2020 11:12 AM    LABGLOM >60 2023 05:02 AM    GLUCOSE 125 2023 05:02 AM    GLUCOSE 110 2023 03:03 PM    CALCIUM 10.3 2024 12:00 PM    BILITOT 0.7 2023 04:49 PM    ALKPHOS 72 2023 04:49 PM    AST 17 2023 04:49 PM    ALT 13 2023 04:49 PM       POC Tests: No results for input(s): \"POCGLU\", \"POCNA\", \"POCK\", \"POCCL\",

## 2024-08-08 LAB — SURGICAL PATHOLOGY REPORT: NORMAL

## 2024-08-10 PROBLEM — Z86.010 PERSONAL HISTORY OF COLONIC POLYPS: Status: ACTIVE | Noted: 2024-08-10

## (undated) DEVICE — MERCY DEFIANCE ENDO KIT: Brand: MEDLINE INDUSTRIES, INC.

## (undated) DEVICE — 4-PORT MANIFOLD: Brand: NEPTUNE 2

## (undated) DEVICE — CO2 CANNULA,SSOFT,ADLT,7O2,4CO2,FEMALE: Brand: MEDLINE

## (undated) DEVICE — SNARE ENDOSCP L240CM SHTH DIA2.4MM LOOP W20MM MIN WRK CHN

## (undated) DEVICE — TRAP SURG QUAD PARABOLA SLOT DSGN SFTY SCRN TRAPEASE

## (undated) DEVICE — PAD, GROUNDING, UNIVERSAL, SPLIT, 9': Brand: MEDLINE